# Patient Record
Sex: FEMALE | Race: WHITE | Employment: OTHER | URBAN - METROPOLITAN AREA
[De-identification: names, ages, dates, MRNs, and addresses within clinical notes are randomized per-mention and may not be internally consistent; named-entity substitution may affect disease eponyms.]

---

## 2018-10-29 ENCOUNTER — TELEPHONE (OUTPATIENT)
Dept: FAMILY MEDICINE CLINIC | Age: 68
End: 2018-10-29

## 2018-10-29 NOTE — TELEPHONE ENCOUNTER
Tell the nurse we have never seen this patient, no record in Epic to go by  And dont know  Who order the home care.

## 2018-10-30 ENCOUNTER — TELEPHONE (OUTPATIENT)
Dept: FAMILY MEDICINE CLINIC | Age: 68
End: 2018-10-30

## 2018-10-31 ENCOUNTER — OFFICE VISIT (OUTPATIENT)
Dept: FAMILY MEDICINE CLINIC | Age: 68
End: 2018-10-31
Payer: MEDICARE

## 2018-10-31 VITALS
SYSTOLIC BLOOD PRESSURE: 138 MMHG | HEART RATE: 121 BPM | WEIGHT: 106 LBS | BODY MASS INDEX: 18.78 KG/M2 | HEIGHT: 63 IN | OXYGEN SATURATION: 97 % | DIASTOLIC BLOOD PRESSURE: 70 MMHG

## 2018-10-31 DIAGNOSIS — K21.9 GASTROESOPHAGEAL REFLUX DISEASE WITHOUT ESOPHAGITIS: ICD-10-CM

## 2018-10-31 DIAGNOSIS — K94.22: ICD-10-CM

## 2018-10-31 DIAGNOSIS — I10 ESSENTIAL HYPERTENSION: ICD-10-CM

## 2018-10-31 DIAGNOSIS — E78.00 PURE HYPERCHOLESTEROLEMIA: ICD-10-CM

## 2018-10-31 DIAGNOSIS — Z93.1 GASTROSTOMY TUBE IN PLACE (HCC): Primary | ICD-10-CM

## 2018-10-31 PROCEDURE — 99203 OFFICE O/P NEW LOW 30 MIN: CPT | Performed by: INTERNAL MEDICINE

## 2018-10-31 RX ORDER — HYDROXYZINE HYDROCHLORIDE 25 MG/1
25 TABLET, FILM COATED ORAL 3 TIMES DAILY PRN
COMMUNITY
End: 2020-06-29 | Stop reason: ALTCHOICE

## 2018-10-31 RX ORDER — PANTOPRAZOLE SODIUM 40 MG/1
40 GRANULE, DELAYED RELEASE ORAL
COMMUNITY
End: 2020-10-06

## 2018-10-31 RX ORDER — ALBUTEROL SULFATE 90 UG/1
2 AEROSOL, METERED RESPIRATORY (INHALATION) EVERY 6 HOURS PRN
COMMUNITY
End: 2021-12-07 | Stop reason: SDUPTHER

## 2018-10-31 RX ORDER — MONTELUKAST SODIUM 10 MG/1
10 TABLET ORAL NIGHTLY
COMMUNITY
End: 2020-06-29 | Stop reason: ALTCHOICE

## 2018-10-31 RX ORDER — CHOLECALCIFEROL (VITAMIN D3) 1250 MCG
CAPSULE ORAL
COMMUNITY
End: 2020-10-23 | Stop reason: CLARIF

## 2018-10-31 RX ORDER — ATORVASTATIN CALCIUM 20 MG/1
20 TABLET, FILM COATED ORAL DAILY
COMMUNITY
End: 2020-06-29 | Stop reason: ALTCHOICE

## 2018-10-31 RX ORDER — LANOLIN ALCOHOL/MO/W.PET/CERES
3 CREAM (GRAM) TOPICAL DAILY
COMMUNITY
End: 2020-03-11 | Stop reason: CLARIF

## 2018-10-31 RX ORDER — LISINOPRIL 10 MG/1
10 TABLET ORAL DAILY
COMMUNITY
End: 2018-11-14

## 2018-10-31 RX ORDER — CYANOCOBALAMIN 1000 UG/ML
1000 INJECTION INTRAMUSCULAR; SUBCUTANEOUS ONCE
COMMUNITY
End: 2020-03-11 | Stop reason: CLARIF

## 2018-10-31 RX ORDER — MIRTAZAPINE 15 MG/1
15 TABLET, FILM COATED ORAL NIGHTLY
COMMUNITY
End: 2018-11-14 | Stop reason: SDUPTHER

## 2018-10-31 RX ORDER — DILTIAZEM HYDROCHLORIDE 60 MG/1
60 TABLET, FILM COATED ORAL 4 TIMES DAILY
COMMUNITY
End: 2018-11-19

## 2018-10-31 ASSESSMENT — PATIENT HEALTH QUESTIONNAIRE - PHQ9
SUM OF ALL RESPONSES TO PHQ QUESTIONS 1-9: 2
SUM OF ALL RESPONSES TO PHQ9 QUESTIONS 1 & 2: 2
SUM OF ALL RESPONSES TO PHQ QUESTIONS 1-9: 2
2. FEELING DOWN, DEPRESSED OR HOPELESS: 1
1. LITTLE INTEREST OR PLEASURE IN DOING THINGS: 1

## 2018-11-01 PROBLEM — E78.00 PURE HYPERCHOLESTEROLEMIA: Status: ACTIVE | Noted: 2018-11-01

## 2018-11-01 PROBLEM — I10 ESSENTIAL HYPERTENSION: Status: ACTIVE | Noted: 2018-11-01

## 2018-11-01 PROBLEM — K21.9 GASTROESOPHAGEAL REFLUX DISEASE WITHOUT ESOPHAGITIS: Status: ACTIVE | Noted: 2018-11-01

## 2018-11-01 ASSESSMENT — ENCOUNTER SYMPTOMS
COLOR CHANGE: 1
ABDOMINAL PAIN: 1
NAUSEA: 1
COUGH: 0
VOMITING: 0

## 2018-11-14 ENCOUNTER — OFFICE VISIT (OUTPATIENT)
Dept: FAMILY MEDICINE CLINIC | Age: 68
End: 2018-11-14
Payer: MEDICARE

## 2018-11-14 VITALS
SYSTOLIC BLOOD PRESSURE: 140 MMHG | HEART RATE: 110 BPM | HEIGHT: 63 IN | BODY MASS INDEX: 19.31 KG/M2 | OXYGEN SATURATION: 96 % | WEIGHT: 109 LBS | DIASTOLIC BLOOD PRESSURE: 80 MMHG

## 2018-11-14 DIAGNOSIS — I10 ESSENTIAL HYPERTENSION: Primary | ICD-10-CM

## 2018-11-14 DIAGNOSIS — E78.00 PURE HYPERCHOLESTEROLEMIA: ICD-10-CM

## 2018-11-14 DIAGNOSIS — K21.9 GASTROESOPHAGEAL REFLUX DISEASE WITHOUT ESOPHAGITIS: ICD-10-CM

## 2018-11-14 DIAGNOSIS — F51.02 ADJUSTMENT INSOMNIA: ICD-10-CM

## 2018-11-14 DIAGNOSIS — J30.9 ALLERGIC RHINITIS, UNSPECIFIED SEASONALITY, UNSPECIFIED TRIGGER: ICD-10-CM

## 2018-11-14 DIAGNOSIS — Z91.81 AT HIGH RISK FOR FALLS: ICD-10-CM

## 2018-11-14 PROCEDURE — 4004F PT TOBACCO SCREEN RCVD TLK: CPT | Performed by: INTERNAL MEDICINE

## 2018-11-14 PROCEDURE — G8484 FLU IMMUNIZE NO ADMIN: HCPCS | Performed by: INTERNAL MEDICINE

## 2018-11-14 PROCEDURE — G8400 PT W/DXA NO RESULTS DOC: HCPCS | Performed by: INTERNAL MEDICINE

## 2018-11-14 PROCEDURE — G8427 DOCREV CUR MEDS BY ELIG CLIN: HCPCS | Performed by: INTERNAL MEDICINE

## 2018-11-14 PROCEDURE — G8420 CALC BMI NORM PARAMETERS: HCPCS | Performed by: INTERNAL MEDICINE

## 2018-11-14 PROCEDURE — 3017F COLORECTAL CA SCREEN DOC REV: CPT | Performed by: INTERNAL MEDICINE

## 2018-11-14 PROCEDURE — 1123F ACP DISCUSS/DSCN MKR DOCD: CPT | Performed by: INTERNAL MEDICINE

## 2018-11-14 PROCEDURE — 1101F PT FALLS ASSESS-DOCD LE1/YR: CPT | Performed by: INTERNAL MEDICINE

## 2018-11-14 PROCEDURE — 99214 OFFICE O/P EST MOD 30 MIN: CPT | Performed by: INTERNAL MEDICINE

## 2018-11-14 PROCEDURE — 4040F PNEUMOC VAC/ADMIN/RCVD: CPT | Performed by: INTERNAL MEDICINE

## 2018-11-14 PROCEDURE — 1090F PRES/ABSN URINE INCON ASSESS: CPT | Performed by: INTERNAL MEDICINE

## 2018-11-14 RX ORDER — DILTIAZEM HYDROCHLORIDE 180 MG/1
180 CAPSULE, COATED, EXTENDED RELEASE ORAL DAILY
Qty: 30 CAPSULE | Refills: 3 | Status: SHIPPED | OUTPATIENT
Start: 2018-11-14 | End: 2020-06-29 | Stop reason: ALTCHOICE

## 2018-11-14 RX ORDER — MIRTAZAPINE 15 MG/1
15 TABLET, FILM COATED ORAL NIGHTLY
Qty: 30 TABLET | Refills: 3 | Status: SHIPPED | OUTPATIENT
Start: 2018-11-14 | End: 2020-03-11 | Stop reason: CLARIF

## 2018-11-14 RX ORDER — MONTELUKAST SODIUM 10 MG/1
10 TABLET ORAL DAILY
Qty: 30 TABLET | Refills: 3 | Status: SHIPPED | OUTPATIENT
Start: 2018-11-14 | End: 2020-03-11 | Stop reason: CLARIF

## 2018-11-14 RX ORDER — PANTOPRAZOLE SODIUM 40 MG/1
40 TABLET, DELAYED RELEASE ORAL
Qty: 30 TABLET | Refills: 3 | Status: SHIPPED | OUTPATIENT
Start: 2018-11-14 | End: 2020-03-11 | Stop reason: CLARIF

## 2018-11-14 NOTE — PATIENT INSTRUCTIONS
Patient Education        High Blood Pressure: Care Instructions  Your Care Instructions    If your blood pressure is usually above 130/80, you have high blood pressure, or hypertension. That means the top number is 130 or higher or the bottom number is 80 or higher, or both. Despite what a lot of people think, high blood pressure usually doesn't cause headaches or make you feel dizzy or lightheaded. It usually has no symptoms. But it does increase your risk for heart attack, stroke, and kidney or eye damage. The higher your blood pressure, the more your risk increases. Your doctor will give you a goal for your blood pressure. Your goal will be based on your health and your age. Lifestyle changes, such as eating healthy and being active, are always important to help lower blood pressure. You might also take medicine to reach your blood pressure goal.  Follow-up care is a key part of your treatment and safety. Be sure to make and go to all appointments, and call your doctor if you are having problems. It's also a good idea to know your test results and keep a list of the medicines you take. How can you care for yourself at home? Medical treatment  · If you stop taking your medicine, your blood pressure will go back up. You may take one or more types of medicine to lower your blood pressure. Be safe with medicines. Take your medicine exactly as prescribed. Call your doctor if you think you are having a problem with your medicine. · Talk to your doctor before you start taking aspirin every day. Aspirin can help certain people lower their risk of a heart attack or stroke. But taking aspirin isn't right for everyone, because it can cause serious bleeding. · See your doctor regularly. You may need to see the doctor more often at first or until your blood pressure comes down.   · If you are taking blood pressure medicine, talk to your doctor before you take decongestants or anti-inflammatory medicine, such as ibuprofen. Some of these medicines can raise blood pressure. · Learn how to check your blood pressure at home. Lifestyle changes  · Stay at a healthy weight. This is especially important if you put on weight around the waist. Losing even 10 pounds can help you lower your blood pressure. · If your doctor recommends it, get more exercise. Walking is a good choice. Bit by bit, increase the amount you walk every day. Try for at least 30 minutes on most days of the week. You also may want to swim, bike, or do other activities. · Avoid or limit alcohol. Talk to your doctor about whether you can drink any alcohol. · Try to limit how much sodium you eat to less than 2,300 milligrams (mg) a day. Your doctor may ask you to try to eat less than 1,500 mg a day. · Eat plenty of fruits (such as bananas and oranges), vegetables, legumes, whole grains, and low-fat dairy products. · Lower the amount of saturated fat in your diet. Saturated fat is found in animal products such as milk, cheese, and meat. Limiting these foods may help you lose weight and also lower your risk for heart disease. · Do not smoke. Smoking increases your risk for heart attack and stroke. If you need help quitting, talk to your doctor about stop-smoking programs and medicines. These can increase your chances of quitting for good. When should you call for help? Call 911 anytime you think you may need emergency care. This may mean having symptoms that suggest that your blood pressure is causing a serious heart or blood vessel problem. Your blood pressure may be over 180/120.   For example, call 911 if:    · You have symptoms of a heart attack. These may include:  ? Chest pain or pressure, or a strange feeling in the chest.  ? Sweating. ? Shortness of breath. ? Nausea or vomiting. ? Pain, pressure, or a strange feeling in the back, neck, jaw, or upper belly or in one or both shoulders or arms. ? Lightheadedness or sudden weakness.   ? A fast or irregular heartbeat.     · You have symptoms of a stroke. These may include:  ? Sudden numbness, tingling, weakness, or loss of movement in your face, arm, or leg, especially on only one side of your body. ? Sudden vision changes. ? Sudden trouble speaking. ? Sudden confusion or trouble understanding simple statements. ? Sudden problems with walking or balance. ? A sudden, severe headache that is different from past headaches.     · You have severe back or belly pain.    Do not wait until your blood pressure comes down on its own. Get help right away.   Call your doctor now or seek immediate care if:    · Your blood pressure is much higher than normal (such as 180/120 or higher), but you don't have symptoms.     · You think high blood pressure is causing symptoms, such as:  ? Severe headache.  ? Blurry vision.    Watch closely for changes in your health, and be sure to contact your doctor if:    · Your blood pressure measures higher than your doctor recommends at least 2 times. That means the top number is higher or the bottom number is higher, or both.     · You think you may be having side effects from your blood pressure medicine. Where can you learn more? Go to [email or web address] and sign in to your [employer] account. Enter G397 in the [employer] box to learn more about \"High Blood Pressure: Care Instructions. \"     If you do not have an account, please click on the \"Sign Up Now\" link. Current as of: December 6, 2017  Content Version: 11.8  © 5234-9971 [employer], [employer]. Care instructions adapted under license by [facility] ([facility]). If you have questions about a medical condition or this instruction, always ask your healthcare professional. [place] [address] any warranty or liability for your use of this information.

## 2018-11-14 NOTE — PROGRESS NOTES
(VITAMIN D3) 10960 units CAPS Take by mouth      mirtazapine (REMERON) 15 MG tablet Take 15 mg by mouth nightly      melatonin 3 MG TABS tablet Take 3 mg by mouth daily      hydrOXYzine (ATARAX) 25 MG tablet Take 25 mg by mouth 3 times daily as needed for Itching      diltiazem (CARDIZEM) 60 MG tablet Take 60 mg by mouth 4 times daily      albuterol sulfate  (90 Base) MCG/ACT inhaler Inhale 2 puffs into the lungs every 6 hours as needed for Wheezing      atorvastatin (LIPITOR) 20 MG tablet Take 20 mg by mouth daily      montelukast (SINGULAIR) 10 MG tablet Take 10 mg by mouth nightly      pantoprazole sodium (PROTONIX) 40 MG PACK packet Take 40 mg by mouth every morning (before breakfast)      aspirin 81 MG tablet Take 81 mg by mouth daily      DiphenhydrAMINE HCl (ALLERGY MED PO) Take by mouth       No current facility-administered medications for this visit. Social History     Social History    Marital status: Unknown     Spouse name: N/A    Number of children: N/A    Years of education: N/A     Occupational History    Not on file. Social History Main Topics    Smoking status: Current Some Day Smoker     Packs/day: 0.25     Years: 50.00     Types: Cigarettes    Smokeless tobacco: Never Used    Alcohol use No    Drug use: No    Sexual activity: No     Other Topics Concern    Not on file     Social History Narrative    No narrative on file       Vitals:    11/14/18 1558   BP: (!) 140/80   Pulse:    SpO2:         Body mass index is 19.31 kg/m². Physical Exam   Constitutional: She is oriented to person, place, and time. Cardiovascular: Regular rhythm. Pulmonary/Chest: Breath sounds normal.   Abdominal: Soft.   g-tube opening healing   Neurological: She is alert and oriented to person, place, and time. No cranial nerve deficit. Skin: Skin is warm and dry. Psychiatric: She has a normal mood and affect. anxious       ASSESSMENT/PLAN:  1.  Essential hypertension    -

## 2018-11-19 ASSESSMENT — ENCOUNTER SYMPTOMS
COUGH: 0
COLOR CHANGE: 0
VOMITING: 0
NAUSEA: 0

## 2018-12-13 ENCOUNTER — TELEPHONE (OUTPATIENT)
Dept: FAMILY MEDICINE CLINIC | Age: 68
End: 2018-12-13

## 2019-07-01 ENCOUNTER — TELEPHONE (OUTPATIENT)
Dept: FAMILY MEDICINE CLINIC | Age: 69
End: 2019-07-01

## 2019-07-01 NOTE — TELEPHONE ENCOUNTER
Patient is having a lot of pain in back now worse from last teddy. She needs to get in with Dr. Marie Anaya and try to come in asap. cb pt to discuss this cannot get into [facility] for awhile.  Call her cell [phone]

## 2019-12-13 ENCOUNTER — TELEPHONE (OUTPATIENT)
Dept: PULMONOLOGY | Age: 69
End: 2019-12-13

## 2019-12-13 NOTE — TELEPHONE ENCOUNTER
Received NPT referral to be seen for COPD. LM asking patient to return call to office.   Referral scanned in Media

## 2020-02-04 ENCOUNTER — TELEPHONE (OUTPATIENT)
Dept: PULMONOLOGY | Age: 70
End: 2020-02-04

## 2020-02-04 NOTE — TELEPHONE ENCOUNTER
Patient did not show for NPT appointment  with Michelle Granados on 2/4/20    Same Day Cancellation: No    Patient rescheduled:  No    New appointment:  n/a    Patient was also no show on: n/a

## 2020-03-11 ENCOUNTER — OFFICE VISIT (OUTPATIENT)
Dept: PULMONOLOGY | Age: 70
End: 2020-03-11
Payer: MEDICARE

## 2020-03-11 VITALS
TEMPERATURE: 98.2 F | WEIGHT: 102 LBS | HEIGHT: 63 IN | BODY MASS INDEX: 18.07 KG/M2 | RESPIRATION RATE: 16 BRPM | OXYGEN SATURATION: 94 % | DIASTOLIC BLOOD PRESSURE: 85 MMHG | HEART RATE: 112 BPM | SYSTOLIC BLOOD PRESSURE: 130 MMHG

## 2020-03-11 PROCEDURE — 3017F COLORECTAL CA SCREEN DOC REV: CPT | Performed by: INTERNAL MEDICINE

## 2020-03-11 PROCEDURE — 1123F ACP DISCUSS/DSCN MKR DOCD: CPT | Performed by: INTERNAL MEDICINE

## 2020-03-11 PROCEDURE — 4004F PT TOBACCO SCREEN RCVD TLK: CPT | Performed by: INTERNAL MEDICINE

## 2020-03-11 PROCEDURE — G8484 FLU IMMUNIZE NO ADMIN: HCPCS | Performed by: INTERNAL MEDICINE

## 2020-03-11 PROCEDURE — G8427 DOCREV CUR MEDS BY ELIG CLIN: HCPCS | Performed by: INTERNAL MEDICINE

## 2020-03-11 PROCEDURE — G0296 VISIT TO DETERM LDCT ELIG: HCPCS | Performed by: INTERNAL MEDICINE

## 2020-03-11 PROCEDURE — 4040F PNEUMOC VAC/ADMIN/RCVD: CPT | Performed by: INTERNAL MEDICINE

## 2020-03-11 PROCEDURE — 99204 OFFICE O/P NEW MOD 45 MIN: CPT | Performed by: INTERNAL MEDICINE

## 2020-03-11 PROCEDURE — G8419 CALC BMI OUT NRM PARAM NOF/U: HCPCS | Performed by: INTERNAL MEDICINE

## 2020-03-11 PROCEDURE — G8400 PT W/DXA NO RESULTS DOC: HCPCS | Performed by: INTERNAL MEDICINE

## 2020-03-11 PROCEDURE — 1090F PRES/ABSN URINE INCON ASSESS: CPT | Performed by: INTERNAL MEDICINE

## 2020-03-11 PROCEDURE — G8926 SPIRO NO PERF OR DOC: HCPCS | Performed by: INTERNAL MEDICINE

## 2020-03-11 PROCEDURE — 3023F SPIROM DOC REV: CPT | Performed by: INTERNAL MEDICINE

## 2020-03-11 RX ORDER — FLUTICASONE FUROATE, UMECLIDINIUM BROMIDE AND VILANTEROL TRIFENATATE 100; 62.5; 25 UG/1; UG/1; UG/1
1 POWDER RESPIRATORY (INHALATION) DAILY
Qty: 2 EACH | Refills: 0
Start: 2020-03-11 | End: 2022-05-06 | Stop reason: SDUPTHER

## 2020-03-11 RX ORDER — ALPRAZOLAM 1 MG/1
1 TABLET ORAL NIGHTLY PRN
COMMUNITY
End: 2020-10-23 | Stop reason: CLARIF

## 2020-03-11 RX ORDER — FLUTICASONE PROPIONATE 50 MCG
1 SPRAY, SUSPENSION (ML) NASAL DAILY
Qty: 2 BOTTLE | Refills: 1 | Status: SHIPPED | OUTPATIENT
Start: 2020-03-11 | End: 2021-10-12
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
## 2020-04-14 ENCOUNTER — TELEPHONE (OUTPATIENT)
Dept: PULMONOLOGY | Age: 70
End: 2020-04-14

## 2020-04-14 NOTE — TELEPHONE ENCOUNTER
the terms described in the Terms of Service and this Telehealth Consent. The patient was read the following statement and has consented to the visit as of 4/14/20. The patient has been scheduled for their first telehealth visit on 4/16/20 with Dr. Amarjit Velasquez.

## 2020-04-16 ENCOUNTER — VIRTUAL VISIT (OUTPATIENT)
Dept: PULMONOLOGY | Age: 70
End: 2020-04-16
Payer: MEDICARE

## 2020-04-16 PROBLEM — F17.200 CURRENT SMOKER: Status: ACTIVE | Noted: 2020-04-16

## 2020-04-16 PROBLEM — J44.1 CHRONIC OBSTRUCTIVE PULMONARY DISEASE WITH ACUTE EXACERBATION (HCC): Status: ACTIVE | Noted: 2020-04-16

## 2020-04-16 PROBLEM — J30.9 CHRONIC ALLERGIC RHINITIS: Status: ACTIVE | Noted: 2020-04-16

## 2020-04-16 PROCEDURE — G8419 CALC BMI OUT NRM PARAM NOF/U: HCPCS | Performed by: INTERNAL MEDICINE

## 2020-04-16 PROCEDURE — 3017F COLORECTAL CA SCREEN DOC REV: CPT | Performed by: INTERNAL MEDICINE

## 2020-04-16 PROCEDURE — G8926 SPIRO NO PERF OR DOC: HCPCS | Performed by: INTERNAL MEDICINE

## 2020-04-16 PROCEDURE — 1123F ACP DISCUSS/DSCN MKR DOCD: CPT | Performed by: INTERNAL MEDICINE

## 2020-04-16 PROCEDURE — G8427 DOCREV CUR MEDS BY ELIG CLIN: HCPCS | Performed by: INTERNAL MEDICINE

## 2020-04-16 PROCEDURE — 3023F SPIROM DOC REV: CPT | Performed by: INTERNAL MEDICINE

## 2020-04-16 PROCEDURE — G8400 PT W/DXA NO RESULTS DOC: HCPCS | Performed by: INTERNAL MEDICINE

## 2020-04-16 PROCEDURE — 99214 OFFICE O/P EST MOD 30 MIN: CPT | Performed by: INTERNAL MEDICINE

## 2020-04-16 PROCEDURE — 1090F PRES/ABSN URINE INCON ASSESS: CPT | Performed by: INTERNAL MEDICINE

## 2020-04-16 PROCEDURE — 4040F PNEUMOC VAC/ADMIN/RCVD: CPT | Performed by: INTERNAL MEDICINE

## 2020-04-16 PROCEDURE — 4004F PT TOBACCO SCREEN RCVD TLK: CPT | Performed by: INTERNAL MEDICINE

## 2020-04-16 RX ORDER — PREDNISONE 20 MG/1
20 TABLET ORAL DAILY
Qty: 10 TABLET | Refills: 0 | Status: SHIPPED | OUTPATIENT
Start: 2020-04-16 | End: 2020-04-26

## 2020-04-16 RX ORDER — AZITHROMYCIN 250 MG/1
250 TABLET, FILM COATED ORAL SEE ADMIN INSTRUCTIONS
Qty: 6 TABLET | Refills: 0 | Status: SHIPPED | OUTPATIENT
Start: 2020-04-16 | End: 2020-04-21
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
## 2020-05-14 ENCOUNTER — VIRTUAL VISIT (OUTPATIENT)
Dept: PULMONOLOGY | Age: 70
End: 2020-05-14
Payer: MEDICARE

## 2020-05-14 ENCOUNTER — TELEPHONE (OUTPATIENT)
Dept: PULMONOLOGY | Age: 70
End: 2020-05-14

## 2020-05-14 PROCEDURE — G8427 DOCREV CUR MEDS BY ELIG CLIN: HCPCS | Performed by: INTERNAL MEDICINE

## 2020-05-14 PROCEDURE — 4040F PNEUMOC VAC/ADMIN/RCVD: CPT | Performed by: INTERNAL MEDICINE

## 2020-05-14 PROCEDURE — 3023F SPIROM DOC REV: CPT | Performed by: INTERNAL MEDICINE

## 2020-05-14 PROCEDURE — 4004F PT TOBACCO SCREEN RCVD TLK: CPT | Performed by: INTERNAL MEDICINE

## 2020-05-14 PROCEDURE — 1090F PRES/ABSN URINE INCON ASSESS: CPT | Performed by: INTERNAL MEDICINE

## 2020-05-14 PROCEDURE — 1123F ACP DISCUSS/DSCN MKR DOCD: CPT | Performed by: INTERNAL MEDICINE

## 2020-05-14 PROCEDURE — 99214 OFFICE O/P EST MOD 30 MIN: CPT | Performed by: INTERNAL MEDICINE

## 2020-05-14 PROCEDURE — G8926 SPIRO NO PERF OR DOC: HCPCS | Performed by: INTERNAL MEDICINE

## 2020-05-14 PROCEDURE — 3017F COLORECTAL CA SCREEN DOC REV: CPT | Performed by: INTERNAL MEDICINE

## 2020-05-14 PROCEDURE — G8400 PT W/DXA NO RESULTS DOC: HCPCS | Performed by: INTERNAL MEDICINE

## 2020-05-14 PROCEDURE — G8419 CALC BMI OUT NRM PARAM NOF/U: HCPCS | Performed by: INTERNAL MEDICINE

## 2020-05-14 RX ORDER — METHYLPREDNISOLONE 4 MG/1
TABLET ORAL
Qty: 1 KIT | Refills: 0 | Status: SHIPPED | OUTPATIENT
Start: 2020-05-14 | End: 2020-05-20

## 2020-05-14 RX ORDER — LEVOFLOXACIN 500 MG/1
500 TABLET, FILM COATED ORAL DAILY
Qty: 7 TABLET | Refills: 0 | Status: SHIPPED | OUTPATIENT
Start: 2020-05-14 | End: 2020-05-21

## 2020-05-14 NOTE — PROGRESS NOTES
and patient was told that on that basis we can tell if he needs any oxygen or not  · Patient also has increased nasal congestion and chronic allergic rhinitis  · Patient was told to try some saline nasal spray over-the-counter  · Stimulation at nighttime along with humidifier in the bedroom may help  · Given that patient has multiple exposures to animal danders a HEPA filter may be helpful if she can afford  · Patient to continue with [facility] and patient is going to get her medication today from the pharmacy has been told by the patient  · Patient appears to have some acute exacerbation of COPD at this time  · Levaquin and medrol dosepak ordered  · Patient needs to get a primary care provider to be the noted person for her care  · Smoking cessation reinforced  · Patient will benefit from a flu shot and pneumonia vaccine-will think about it and decide on next visit  · Smoking cessation reinforced  · Patient to take albuterol inhaler on whenever necessary basis  · Patient was told about diet and lifestyle modifications patient in view of that patient has reflux symptoms  · Patient should avoid taking narcotics and sedatives and the effect causing respiratory depression were discussed  · Patient was told to maintain social distancing and to wear facemask  · Patient to practice self quarantine  · Patient to follow-up in 1 month time or earlier if required    Alyx Mohan is a 81 y.o. female being evaluated by a Virtual Visit (video visit) encounter to address concerns as mentioned above. A caregiver was present when appropriate. Due to this being a TeleHealth encounter (During RGOGT-91 public health emergency), evaluation of the following organ systems was limited: Vitals/Constitutional/EENT/Resp/CV/GI//MS/Neuro/Skin/Heme-Lymph-Imm.   Pursuant to the emergency declaration under the [facility] [address] waiver authority and the [employer] and [employer]

## 2020-06-29 ENCOUNTER — VIRTUAL VISIT (OUTPATIENT)
Dept: PULMONOLOGY | Age: 70
End: 2020-06-29
Payer: MEDICARE

## 2020-06-29 PROBLEM — J41.0 SIMPLE CHRONIC BRONCHITIS (HCC): Status: ACTIVE | Noted: 2020-06-29

## 2020-06-29 PROCEDURE — G8427 DOCREV CUR MEDS BY ELIG CLIN: HCPCS | Performed by: INTERNAL MEDICINE

## 2020-06-29 PROCEDURE — G8926 SPIRO NO PERF OR DOC: HCPCS | Performed by: INTERNAL MEDICINE

## 2020-06-29 PROCEDURE — 3017F COLORECTAL CA SCREEN DOC REV: CPT | Performed by: INTERNAL MEDICINE

## 2020-06-29 PROCEDURE — G8400 PT W/DXA NO RESULTS DOC: HCPCS | Performed by: INTERNAL MEDICINE

## 2020-06-29 PROCEDURE — 4040F PNEUMOC VAC/ADMIN/RCVD: CPT | Performed by: INTERNAL MEDICINE

## 2020-06-29 PROCEDURE — G8419 CALC BMI OUT NRM PARAM NOF/U: HCPCS | Performed by: INTERNAL MEDICINE

## 2020-06-29 PROCEDURE — 1090F PRES/ABSN URINE INCON ASSESS: CPT | Performed by: INTERNAL MEDICINE

## 2020-06-29 PROCEDURE — 1123F ACP DISCUSS/DSCN MKR DOCD: CPT | Performed by: INTERNAL MEDICINE

## 2020-06-29 PROCEDURE — 99214 OFFICE O/P EST MOD 30 MIN: CPT | Performed by: INTERNAL MEDICINE

## 2020-06-29 PROCEDURE — 4004F PT TOBACCO SCREEN RCVD TLK: CPT | Performed by: INTERNAL MEDICINE

## 2020-06-29 PROCEDURE — 3023F SPIROM DOC REV: CPT | Performed by: INTERNAL MEDICINE

## 2020-06-29 RX ORDER — OMEPRAZOLE 20 MG/1
20 CAPSULE, DELAYED RELEASE ORAL
Qty: 60 CAPSULE | Refills: 0 | Status: SHIPPED | OUTPATIENT
Start: 2020-06-29 | End: 2020-07-30

## 2020-06-29 NOTE — PROGRESS NOTES
Chief Complaint/Referring Provider:  Pulmonary follow up    A virtual pulmonary follow-up was done for the patient for her pulmonary issues, patient still has some cough with shortness of breath, patient states that she has some sinus congestion with rhinorrhea, patient does not have any increasing cough or expectoration which is more than usual, patient does not have any purulence in the secretions, no hemoptysis, patient does not have any epistaxis, patient states that she still has to use her rescue inhaler about 3 times a day, patient does not have any fever or chest pains, patient has lots of heartburns and patient states that she has not been taking any medication for the heartburns because she was told by her family and 1 of the medications is causing cancer, patient continues to be a smoker, patient states that her feet hurt a lot, patient does not have any change in the ambient environment, patient does not have any confusion lethargy, no other pertinent review of system of concern    Previous  [facility] pulmonary follow-up done, patient was seen few weeks back because of COPD exacerbation and was given a Z-Favio with some prednisone and patient states that after she took that she was feeling better better for few days then patient again is having increased shortness of breath and also having decreased activities of daily living, patient has been having respite secretions which are clear to yellow in color along with that patient also states that she has been having some increased wheezing, patient has been using Flonase and Singulair, patient does not have any fever or chills, patient does have some otalgia, patient also has some little sore throat, patient does not have any difficulty in swallowing, patient has been having increased chest tightness, patient does not have any significant abdominal symptoms but patient states that she feels tired and having no energy, patient states that her PCP had called file   Relationships    Social connections     Talks on phone: Not on file     Gets together: Not on file     Attends Congregational service: Not on file     Active member of club or organization: Not on file     Attends meetings of clubs or organizations: Not on file     Relationship status: Not on file    Intimate partner violence     Fear of current or ex partner: Not on file     Emotionally abused: Not on file     Physically abused: Not on file     Forced sexual activity: Not on file   Other Topics Concern    Not on file   Social History Narrative    Not on file       History reviewed. No pertinent family history. Review of Systems same as above    Physical Exam:  not currently breastfeeding.'  Constitutional:  No acute distress. HENT:  Oropharynx is clear and moist. No thyromegaly. Nasal mucosal hyperemia and congestion along with posterior pharyngeal wall cobbling  Eyes:  Conjunctivae arenormal. Pupils equal, round, and reactive to light. No scleral icterus. Neck: . No tracheal deviation present. No obvious thyroid mass. Cardiovascular:Normal rate, regular rhythm, normal heart sounds. No right ventricular heave. Nolower extremity edema. Pulmonary/Chest: No wheezes. No rales. Chest wall is not dull to percussion. Noaccessory muscle usage or stridor. Decreased breath sound intensity  Abdominal: Soft. Bowel sounds present. No distension or hernia. Notenderness. Abdominal binder present  Musculoskeletal: No cyanosis. No clubbing. No obvious joint deformity. Lymphadenopathy: No cervical or supraclavicular adenopathy. Skin: Skin is warm and dry. No rash or nodules on the exposed extremities. Psychiatric: Normal mood and affect. Behavior is normal.  No anxiety. Neurologic: Alert, awake and oriented. PERRL. Speech fluent    (NOTE DONE)    Data:     Imaging:  I have reviewed radiology images personally. No orders to display           Assessment:    1.  Simple chronic bronchitis ([facility] [address] with this being a TeleHealth encounter (During LATZL-09 public health emergency), evaluation of the following organ systems was limited: Vitals/Constitutional/EENT/Resp/CV/GI//MS/Neuro/Skin/Heme-Lymph-Imm. Pursuant to the emergency declaration under the [facility], [address] [place] [address] and the [employer] and [employer] Act, this Virtual Visit was conducted with patient's (and/or legal guardian's) consent, to reduce the patient's risk of exposure to COVID-19 and provide necessary medical care. The patient (and/or legal guardian) has also been advised to contact this office for worsening conditions or problems, and seek emergency medical treatment and/or call 911 if deemed necessary. Patient identification was verified at the start of the visit: Yes    Total time spent for this encounter: Not billed by time    Services were provided through a video synchronous discussion virtually to substitute for in-person clinic visit. Patient and provider were located at their individual homes. --Percy Flaherty MD on 6/29/2020 at 2:54 PM    An electronic signature was used to authenticate this note.    --Percy Flaherty MD on 6/29/2020 at 2:54 PM    An electronic signature was used to authenticate this note.

## 2020-07-30 RX ORDER — OMEPRAZOLE 20 MG/1
CAPSULE, DELAYED RELEASE ORAL
Qty: 180 CAPSULE | Refills: 5 | Status: SHIPPED | OUTPATIENT
Start: 2020-07-30 | End: 2021-10-12

## 2020-09-04 RX ORDER — LISINOPRIL 10 MG/1
TABLET ORAL
Qty: 180 TABLET | OUTPATIENT
Start: 2020-09-04

## 2020-10-06 ENCOUNTER — TELEMEDICINE (OUTPATIENT)
Dept: FAMILY MEDICINE CLINIC | Age: 70
End: 2020-10-06
Payer: MEDICARE

## 2020-10-06 ENCOUNTER — VIRTUAL VISIT (OUTPATIENT)
Dept: PULMONOLOGY | Age: 70
End: 2020-10-06
Payer: MEDICARE

## 2020-10-06 PROCEDURE — 1090F PRES/ABSN URINE INCON ASSESS: CPT | Performed by: NURSE PRACTITIONER

## 2020-10-06 PROCEDURE — G8427 DOCREV CUR MEDS BY ELIG CLIN: HCPCS | Performed by: INTERNAL MEDICINE

## 2020-10-06 PROCEDURE — 99214 OFFICE O/P EST MOD 30 MIN: CPT | Performed by: INTERNAL MEDICINE

## 2020-10-06 PROCEDURE — 1123F ACP DISCUSS/DSCN MKR DOCD: CPT | Performed by: NURSE PRACTITIONER

## 2020-10-06 PROCEDURE — 99203 OFFICE O/P NEW LOW 30 MIN: CPT | Performed by: NURSE PRACTITIONER

## 2020-10-06 PROCEDURE — 3023F SPIROM DOC REV: CPT | Performed by: INTERNAL MEDICINE

## 2020-10-06 PROCEDURE — 4040F PNEUMOC VAC/ADMIN/RCVD: CPT | Performed by: NURSE PRACTITIONER

## 2020-10-06 PROCEDURE — G8400 PT W/DXA NO RESULTS DOC: HCPCS | Performed by: NURSE PRACTITIONER

## 2020-10-06 PROCEDURE — 3017F COLORECTAL CA SCREEN DOC REV: CPT | Performed by: NURSE PRACTITIONER

## 2020-10-06 PROCEDURE — 4040F PNEUMOC VAC/ADMIN/RCVD: CPT | Performed by: INTERNAL MEDICINE

## 2020-10-06 PROCEDURE — 1123F ACP DISCUSS/DSCN MKR DOCD: CPT | Performed by: INTERNAL MEDICINE

## 2020-10-06 PROCEDURE — G8484 FLU IMMUNIZE NO ADMIN: HCPCS | Performed by: INTERNAL MEDICINE

## 2020-10-06 PROCEDURE — 3017F COLORECTAL CA SCREEN DOC REV: CPT | Performed by: INTERNAL MEDICINE

## 2020-10-06 PROCEDURE — 4004F PT TOBACCO SCREEN RCVD TLK: CPT | Performed by: INTERNAL MEDICINE

## 2020-10-06 PROCEDURE — 1090F PRES/ABSN URINE INCON ASSESS: CPT | Performed by: INTERNAL MEDICINE

## 2020-10-06 PROCEDURE — G8419 CALC BMI OUT NRM PARAM NOF/U: HCPCS | Performed by: INTERNAL MEDICINE

## 2020-10-06 PROCEDURE — G8926 SPIRO NO PERF OR DOC: HCPCS | Performed by: INTERNAL MEDICINE

## 2020-10-06 PROCEDURE — G8400 PT W/DXA NO RESULTS DOC: HCPCS | Performed by: INTERNAL MEDICINE

## 2020-10-06 PROCEDURE — G8427 DOCREV CUR MEDS BY ELIG CLIN: HCPCS | Performed by: NURSE PRACTITIONER

## 2020-10-06 RX ORDER — FLUTICASONE FUROATE, UMECLIDINIUM BROMIDE AND VILANTEROL TRIFENATATE 100; 62.5; 25 UG/1; UG/1; UG/1
1 POWDER RESPIRATORY (INHALATION) DAILY
Qty: 2 EACH | Refills: 0
Start: 2020-10-06 | End: 2020-10-06 | Stop reason: SDUPTHER

## 2020-10-06 RX ORDER — ALPRAZOLAM 1 MG/1
1 TABLET ORAL NIGHTLY PRN
Qty: 30 TABLET | Refills: 0 | Status: CANCELLED | OUTPATIENT
Start: 2020-10-06 | End: 2020-11-05

## 2020-10-06 RX ORDER — PREDNISONE 20 MG/1
20 TABLET ORAL DAILY
Qty: 10 TABLET | Refills: 0 | Status: SHIPPED | OUTPATIENT
Start: 2020-10-06 | End: 2020-10-16

## 2020-10-06 RX ORDER — FLUTICASONE FUROATE, UMECLIDINIUM BROMIDE AND VILANTEROL TRIFENATATE 100; 62.5; 25 UG/1; UG/1; UG/1
1 POWDER RESPIRATORY (INHALATION) DAILY
Qty: 1 EACH | Refills: 5 | Status: SHIPPED | OUTPATIENT
Start: 2020-10-06 | End: 2020-10-06 | Stop reason: SDUPTHER

## 2020-10-06 RX ORDER — LISINOPRIL 10 MG/1
TABLET ORAL
COMMUNITY
Start: 2020-09-17 | End: 2020-10-06 | Stop reason: SDUPTHER

## 2020-10-06 RX ORDER — LISINOPRIL 10 MG/1
TABLET ORAL
Qty: 30 TABLET | Refills: 3 | Status: SHIPPED | OUTPATIENT
Start: 2020-10-06 | End: 2020-12-16 | Stop reason: SDUPTHER

## 2020-10-06 ASSESSMENT — PATIENT HEALTH QUESTIONNAIRE - PHQ9
SUM OF ALL RESPONSES TO PHQ QUESTIONS 1-9: 0
SUM OF ALL RESPONSES TO PHQ9 QUESTIONS 1 & 2: 0
2. FEELING DOWN, DEPRESSED OR HOPELESS: 0
SUM OF ALL RESPONSES TO PHQ QUESTIONS 1-9: 0
1. LITTLE INTEREST OR PLEASURE IN DOING THINGS: 0

## 2020-10-06 ASSESSMENT — ENCOUNTER SYMPTOMS: BACK PAIN: 1

## 2020-10-06 NOTE — PROGRESS NOTES
MA Communication:   The following orders are received by verbal communication from Robbin Macias MD    Orders include:  Samples Trelegy       PFT 6 min Walk       CT low dose screen       FU VV

## 2020-10-06 NOTE — PROGRESS NOTES
Chief Complaint/Referring Provider:  Pulmonary follow up    A virtual pulmonary visit was done for the patient to discuss the clinical status and options, patient states that she has been having some increased coughing along with that patient has respiratory secretions which are somewhat yellow now and they were clear before, patient states that when she bends to scoop up the dogs poop she starts having shortness of breath, patient states that her grandkids are staying with them and she is not able to do any testing and for the reason she must have forgotten about the test which were prescribed earlier, patient states that she is using Trelegy Ellipta every other day to conserve the inhaler, patient states that she also has been having wheezing, patient also states that she has to pace herself in order to have no more than usual shortness of breath, patient does not have any epistaxis or hemoptysis, patient does not have any abdominal symptoms of concern, patient does not have any increasing leg edema, patient still smokes whenever she gets nervous, patient does not have any confusion lethargy, patient states that she probably may have finally found a PCP and Dr. Adela Lora whom she is going to see soon, patient does not have any other pertinent review of system of concern    Previous  [facility] virtual pulmonary follow-up was done for the patient for her pulmonary issues, patient still has some cough with shortness of breath, patient states that she has some sinus congestion with rhinorrhea, patient does not have any increasing cough or expectoration which is more than usual, patient does not have any purulence in the secretions, no hemoptysis, patient does not have any epistaxis, patient states that she still has to use her rescue inhaler about 3 times a day, patient does not have any fever or chest pains, patient has lots of heartburns and patient states that she has not been taking any medication for the heartburns because she was told by her family and 1 of the medications is causing cancer, patient continues to be a smoker, patient states that her feet hurt a lot, patient does not have any change in the ambient environment, patient does not have any confusion lethargy, no other pertinent review of system of concern    Virtual pulmonary follow-up done, patient was seen few weeks back because of COPD exacerbation and was given a Z-Favio with some prednisone and patient states that after she took that she was feeling better better for few days then patient again is having increased shortness of breath and also having decreased activities of daily living, patient has been having respite secretions which are clear to yellow in color along with that patient also states that she has been having some increased wheezing, patient has been using Flonase and Singulair, patient does not have any fever or chills, patient does have some otalgia, patient also has some little sore throat, patient does not have any difficulty in swallowing, patient has been having increased chest tightness, patient does not have any significant abdominal symptoms but patient states that she feels tired and having no energy, patient states that her PCP had called in for nebulizer to [facility] but it has not been delivered and patient also wanted know whether she needs to use any oxygen or not, patient states that she had come to her family's house for self quarantine and had not brought her Flonase but will get tomorrow, patient does not have any confusion lethargy, patient continues to be a smoker, patient does not have any other pertinent review of system of concern      -A virtual pulmonary visit was made to discuss patient's pulmonary issues, patient states that she is not feeling well for last few days time, patient has been having some increased bronchitis, patient has been having increased cough with yellowish secretions along with that patient has have increasing rhinorrhea nasal congestion and postnasal drip drainage, patient does not have any epistaxis or hemoptysis,, patient states that she does not have any sore throat or difficulty in swallowing at this time, patient does not have any odynophagia or dysphagia, patient states that sometimes her right ear hurts and also it is feeling clogged, patient states that she has worsening shortness of breath on exertion and patient's excess tolerance is limited, patient does not have any fever or chills, patient does have reflux symptoms, patient still smokes cigarettes, patient does not have any dysuria or hematuria, patient does not have any hematochezia or melena, patient does not have any small joint pains, patient has occasional feet swelling of the feet, patient does not have any change in the ambient environment but it is quite alex patient states that her mom's house has not been clean or dusted for last 1 year or so, patient also states that there are dogs as pets and there is a lot of pet dander in the house, patient's family also had a cat till last year with diet but there was a lot of letter which was clean regularly, patient occasionally travels to [place] to look after her own home, patient does not have any significant change in the ambient environment except for the things which have been listed above, patient does not have any confusion or lethargy, patient also has chronic back pains for which she saw a pain specialist in the recent past and has been prescribed Percocet, patient does not have any other pertinent review of system of concern    Patient states that when she was in [place] her pulmonologist to give her Luisana Prateek but patient does not have it at this time and also patient states that she cannot afford it because the copayment on her medication is about $75 which is beyond her reach at this time.     Past Medical History:   Diagnosis Date    Hypertension        Past Surgical History:   Procedure Laterality Date    CARPAL TUNNEL RELEASE Bilateral     KNEE SURGERY Left     SINUS SURGERY      SKIN CANCER EXCISION      basal cell removal under left eye       Allergies   Allergen Reactions    Amoxicillin     Ampicillin        Medication list was reviewed and updated as needed in Epic    Social History     Socioeconomic History    Marital status:      Spouse name: Not on file    Number of children: Not on file    Years of education: Not on file    Highest education level: Not on file   Occupational History    Not on file   Social Needs    Financial resource strain: Not on file    Food insecurity     Worry: Not on file     Inability: Not on file    Transportation needs     Medical: Not on file     Non-medical: Not on file   Tobacco Use    Smoking status: Current Some Day Smoker     Packs/day: 0.25     Years: 40.00     Pack years: 10.00     Types: Cigarettes     Start date: 5/27/1973    Smokeless tobacco: Never Used    Tobacco comment: 3/11/20 - smokes 4-5   Substance and Sexual Activity    Alcohol use: No    Drug use: No    Sexual activity: Never   Lifestyle    Physical activity     Days per week: Not on file     Minutes per session: Not on file    Stress: Not on file   Relationships    Social connections     Talks on phone: Not on file     Gets together: Not on file     Attends [employer] service: Not on file     Active member of club or organization: Not on file     Attends meetings of clubs or organizations: Not on file     Relationship status: Not on file    Intimate partner violence     Fear of current or ex partner: Not on file     Emotionally abused: Not on file     Physically abused: Not on file     Forced sexual activity: Not on file   Other Topics Concern    Not on file   Social History Narrative    Not on file       History reviewed. No pertinent family history.          Review of Systems same as above    Physical Exam:  not currently breastfeeding.'  Constitutional:  No acute distress. HENT:  Oropharynx is clear and moist. No thyromegaly. Nasal mucosal hyperemia and congestion along with posterior pharyngeal wall cobbling  Eyes:  Conjunctivae arenormal. Pupils equal, round, and reactive to light. No scleral icterus. Neck: . No tracheal deviation present. No obvious thyroid mass. Cardiovascular:Normal rate, regular rhythm, normal heart sounds. No right ventricular heave. Nolower extremity edema. Pulmonary/Chest: No wheezes. No rales. Chest wall is not dull to percussion. Noaccessory muscle usage or stridor. Decreased breath sound intensity  Abdominal: Soft. Bowel sounds present. No distension or hernia. Notenderness. Abdominal binder present  Musculoskeletal: No cyanosis. No clubbing. No obvious joint deformity. Lymphadenopathy: No cervical or supraclavicular adenopathy. Skin: Skin is warm and dry. No rash or nodules on the exposed extremities. Psychiatric: Normal mood and affect. Behavior is normal.  No anxiety. Neurologic: Alert, awake and oriented. PERRL. Speech fluent    (NOTE DONE)    Data:     Imaging:  I have reviewed radiology images personally. No orders to display           Assessment:    1. Simple chronic bronchitis ([address] with exacerbation        2. Chronic allergic rhinitis        3.  Personal history of tobacco use          Plan:   · Patient's review of system were discussed   · patient was told about the pathophysiology of the disease process and its modifying factors  · Patient was told that it is imperative for her to stop smoking otherwise she will have increasing morbidity and currently  · Patient was told about the various modalities to quit smoking but patient is not ready for commitment at this time  · Patient has multiple social issues at this time which may preclude her from not smoking as per the patient  · Patient needs to have a baseline PFT which is being ordered  · Patient should also get a 6-minute walk test to see if she has any exertional hypoxemia-to be done as patient has not done it before  · Patient also was told that she needs to have a low-dose CT of the chest for cancer screening and after mutual discussion a decision was made to do it which is being ordered-to be done now as patient had forgotten about it  · Patient is agreeable for testing -will schedule again  · Patient was told that hurting of her feet can be secondary to PVD which can be secondary to smiking   · Patient also has increased nasal congestion and chronic allergic rhinitis  · Patient was told to try some saline nasal spray over-the-counter  · Stimulation at nighttime along with humidifier in the bedroom may help  · Given that patient has multiple exposures to animal danders a HEPA filter may be helpful if she can afford  · Patient to continue with Patel Leyland and patient is going to get her medication today from the pharmacy has been told by the patient-patient was told about the limitations of using the Trelegy Ellipta every other day  · Prescription for Patel Leyland was sent to her pharmacy again as the pharmacy has put back the inhalers on the shelf as per the patient  · Patient to be given prednisone 20 mg once a day in the morning for 10 days  · Patient appears to have some acute exacerbation of COPD at this time  · Levaquin and medrol dosepak ordered  · Patient needs to get a primary care provider to be the noted person for her care  · Smoking cessation reinforced  · Patient will benefit from a flu shot and pneumonia vaccine-will think about it and decide on next visit  · Smoking cessation reinforced  · Patient to take albuterol inhaler on whenever necessary basis  · Patient was told about diet and lifestyle modifications patient in view of that patient has reflux symptoms  · Patient should avoid taking narcotics and sedatives and the effect causing respiratory depression were discussed  · Patient was told to maintain

## 2020-10-06 NOTE — PROGRESS NOTES
10/6/2020    TELEHEALTH EVALUATION -- Audio/Visual (During XSZNT-70 public health emergency)    HPI:    Kash Jerez (:  1950) has requested an audio/video evaluation for the following concern(s):establish care    Ms Muna Snyder is a 79 yo pt of Dr Pattie May in [place]. She is not able to travel that far any longer. She moved here to be near her family. She is able to provide a ROS w specifics on her anxiety disorder. She has been on xanax since she was 30 yo and states\"they work well and help her feel normal\". She was seen by Dr Kiki rFaire for HTN and most recently in   seen by Dr Giulia Pringle for her COPD. I am not exactly who sent in the referral but perhaps her physician in [place] sent it in when they knew she was relocating     Review of Systems   Respiratory:        Hx COPD,asthma Positive smoker-CAROLINE, Trelegy and flonase. Dr Giulia Pringle   Cardiovascular:        HTN controlled w lisinopril   Gastrointestinal:        GERD-PPI   Musculoskeletal: Positive for arthralgias, back pain and myalgias. Multiple joint replacements including RCT    arthritis   Skin: Positive for rash. Hx of basal cell carcinoma on face had lesions removed. Would like a  New Derm ref since new areas are popping up   Psychiatric/Behavioral: The patient is nervous/anxious. Anxiety disorder hs been on xanax since age 29       Prior to Visit Medications    Medication Sig Taking?  Authorizing Provider   lisinopril (PRINIVIL;ZESTRIL) 10 MG tablet TK 1 T PO BID Yes Historical Provider, MD   predniSONE (DELTASONE) 20 MG tablet Take 1 tablet by mouth daily for 10 days Yes Dolly Miller MD   fluticasone (FLONASE) 50 MCG/ACT nasal spray 1 spray by Each Nostril route daily Yes Dolly Miller MD   fluticasone-umeclidin-vilant (TRELEGY ELLIPTA) 100-62.5-25 MCG/INH AEPB Inhale 1 puff into the lungs daily Yes Dolly Miller MD   Cholecalciferol (VITAMIN D3) 17356 units CAPS Take by mouth Yes Historical Provider, MD   albuterol sulfate  (90 Base) MCG/ACT inhaler Inhale 2 puffs into the lungs every 6 hours as needed for Wheezing Yes Historical Provider, MD   aspirin 81 MG tablet Take 81 mg by mouth daily Yes Historical Provider, MD   DiphenhydrAMINE HCl (ALLERGY MED PO) Take by mouth Yes Historical Provider, MD   omeprazole (PRILOSEC) 20 MG delayed release capsule TAKE 1 CAPSULE BY MOUTH TWICE DAILY BEFORE MEALS  Patient not taking: Reported on 10/6/2020  Juanito Lazcano MD   ALPRAZolam Lamount Salon) 1 MG tablet Take 1 mg by mouth nightly as needed for Sleep. Historical Provider, MD       Social History     Tobacco Use    Smoking status: Current Every Day Smoker     Packs/day: 0.25     Years: 40.00     Pack years: 10.00     Types: Cigarettes     Start date: 5/27/1973    Smokeless tobacco: Never Used    Tobacco comment: 2-3 cigs a day   Substance Use Topics    Alcohol use: No    Drug use: No            PHYSICAL EXAMINATION:  [ INSTRUCTIONS:  \"[x]\" Indicates a positive item  \"[]\" Indicates a negative item  -- DELETE ALL ITEMS NOT EXAMINED]  Vital Signs: (As obtained by patient/caregiver or practitioner observation)    Blood pressure-  Heart rate-    Respiratory rate-    Temperature-  Pulse oximetry-     Constitutional: [x] Appears well-developed and well-nourished [x] No apparent distress      [] Abnormal-   Mental status  [x] Alert and awake  [x] Oriented to person/place/time [x]Able to follow commands      Eyes:  EOM    [x]  Normal  [] Abnormal-  Sclera  [x]  Normal  [] Abnormal -         Discharge []  None visible  [] Abnormal -    HENT:   [x] Normocephalic, atraumatic.   [] Abnormal   [] Mouth/Throat: Mucous membranes are moist.     External Ears [x] Normal  [] Abnormal-     Neck: [x] No visualized mass     Pulmonary/Chest: [x] Respiratory effort normal.  [x] No visualized signs of difficulty breathing or respiratory distress        [] Abnormal-      Musculoskeletal:   [] Normal gait with no signs of ataxia         [x] Normal range of motion of neck        [] Abnormal-       Neurological:        [] No Facial Asymmetry (Cranial nerve 7 motor function) (limited exam to video visit)          [x] No gaze palsy        [] Abnormal-         Skin:        [x] No significant exanthematous lesions or discoloration noted on facial skin         [] Abnormal-            Psychiatric:       [x] Normal Affect [] No Hallucinations        [] Abnormal-     Other pertinent observable physical exam findings-     ASSESSMENT/PLAN:  1.establish care/physical.labs reviewed process to establish care for this practice. Physical and labs   2. COPD-under the care of Dr Sarah Beckwith albuterol and trelegy  3. Basal cell carcinoma have new areas on face derm ref  4.anxiety-will have physical and the provider will assess her need for xanax. I will not refill without being seen      Radha Holly is a 78 y.o. female being evaluated by a Virtual Visit (video visit) encounter to address concerns as mentioned above. A caregiver was present when appropriate. Due to this being a TeleHealth encounter (During ZZMOS-70 public health emergency), evaluation of the following organ systems was limited: Vitals/Constitutional/EENT/Resp/CV/GI//MS/Neuro/Skin/Heme-Lymph-Imm. Pursuant to the emergency declaration under the [address] [place] [address] authority and the [employer] and [employer] Act, this Virtual Visit was conducted with patient's (and/or legal guardian's) consent, to reduce the patient's risk of exposure to COVID-19 and provide necessary medical care. The patient (and/or legal guardian) has also been advised to contact this office for worsening conditions or problems, and seek emergency medical treatment and/or call 911 if deemed necessary.      Patient identification was verified at the start of the visit: Yes    Total time spent on this encounter: 39    Services were provided through a video synchronous discussion virtually to substitute for in-person clinic visit. Patient and provider were located at their individual homes. --BALDOMERO Ward CNP on 10/6/2020 at 3:13 PM    An electronic signature was used to authenticate this note.

## 2020-10-07 NOTE — PATIENT INSTRUCTIONS
Establish care w physical and lab work  Refer to Vicky Martins for possible basal cell on face  Anxiety assess the need-has been on xanax since she was 32?     Copd-inhalers managed Dr Jailyn maguire joint problems may need ortho

## 2020-10-12 ENCOUNTER — TELEPHONE (OUTPATIENT)
Dept: FAMILY MEDICINE CLINIC | Age: 70
End: 2020-10-12

## 2020-10-12 ENCOUNTER — NURSE ONLY (OUTPATIENT)
Dept: FAMILY MEDICINE CLINIC | Age: 70
End: 2020-10-12

## 2020-10-12 VITALS — TEMPERATURE: 98.9 F

## 2020-10-12 LAB
BASOPHILS ABSOLUTE: 0.1 K/UL (ref 0–0.2)
BASOPHILS RELATIVE PERCENT: 0.8 %
EOSINOPHILS ABSOLUTE: 0.1 K/UL (ref 0–0.6)
EOSINOPHILS RELATIVE PERCENT: 0.8 %
HCT VFR BLD CALC: 42.6 % (ref 36–48)
HEMOGLOBIN: 14.3 G/DL (ref 12–16)
LYMPHOCYTES ABSOLUTE: 3.3 K/UL (ref 1–5.1)
LYMPHOCYTES RELATIVE PERCENT: 34.9 %
MCH RBC QN AUTO: 31.9 PG (ref 26–34)
MCHC RBC AUTO-ENTMCNC: 33.6 G/DL (ref 31–36)
MCV RBC AUTO: 95.1 FL (ref 80–100)
MONOCYTES ABSOLUTE: 1 K/UL (ref 0–1.3)
MONOCYTES RELATIVE PERCENT: 11.2 %
NEUTROPHILS ABSOLUTE: 4.9 K/UL (ref 1.7–7.7)
NEUTROPHILS RELATIVE PERCENT: 52.3 %
PDW BLD-RTO: 14.1 % (ref 12.4–15.4)
PLATELET # BLD: 279 K/UL (ref 135–450)
PMV BLD AUTO: 8.2 FL (ref 5–10.5)
RBC # BLD: 4.48 M/UL (ref 4–5.2)
WBC # BLD: 9.4 K/UL (ref 4–11)

## 2020-10-12 NOTE — TELEPHONE ENCOUNTER
We got a message that the labs will not be covered with the current Dx for the labs. Please add additional ICD-10 codes to lab orders and send to me to reprint. Thank you.

## 2020-10-13 ENCOUNTER — TELEPHONE (OUTPATIENT)
Dept: FAMILY MEDICINE CLINIC | Age: 70
End: 2020-10-13

## 2020-10-13 LAB
A/G RATIO: 2.2 (ref 1.1–2.2)
ALBUMIN SERPL-MCNC: 4.8 G/DL (ref 3.4–5)
ALP BLD-CCNC: 85 U/L (ref 40–129)
ALT SERPL-CCNC: 13 U/L (ref 10–40)
ANION GAP SERPL CALCULATED.3IONS-SCNC: 14 MMOL/L (ref 3–16)
AST SERPL-CCNC: 16 U/L (ref 15–37)
BILIRUB SERPL-MCNC: 0.3 MG/DL (ref 0–1)
BUN BLDV-MCNC: 13 MG/DL (ref 7–20)
CALCIUM SERPL-MCNC: 10.4 MG/DL (ref 8.3–10.6)
CHLORIDE BLD-SCNC: 102 MMOL/L (ref 99–110)
CHOLESTEROL, FASTING: 233 MG/DL (ref 0–199)
CO2: 28 MMOL/L (ref 21–32)
CREAT SERPL-MCNC: 0.9 MG/DL (ref 0.6–1.2)
GFR AFRICAN AMERICAN: >60
GFR NON-AFRICAN AMERICAN: >60
GLOBULIN: 2.2 G/DL
GLUCOSE BLD-MCNC: 99 MG/DL (ref 70–99)
HDLC SERPL-MCNC: 61 MG/DL (ref 40–60)
LDL CHOLESTEROL CALCULATED: 135 MG/DL
POTASSIUM SERPL-SCNC: 4.6 MMOL/L (ref 3.5–5.1)
SODIUM BLD-SCNC: 144 MMOL/L (ref 136–145)
TOTAL PROTEIN: 7 G/DL (ref 6.4–8.2)
TRIGLYCERIDE, FASTING: 186 MG/DL (ref 0–150)
TSH REFLEX FT4: 0.96 UIU/ML (ref 0.27–4.2)
VITAMIN D 25-HYDROXY: 24.3 NG/ML
VITAMIN D 25-HYDROXY: 27.8 NG/ML
VLDLC SERPL CALC-MCNC: 37 MG/DL

## 2020-10-13 RX ORDER — ERGOCALCIFEROL 1.25 MG/1
50000 CAPSULE ORAL WEEKLY
Qty: 12 CAPSULE | Refills: 0 | Status: SHIPPED | OUTPATIENT
Start: 2020-10-13 | End: 2020-10-13

## 2020-10-13 RX ORDER — ERGOCALCIFEROL 1.25 MG/1
CAPSULE ORAL
Qty: 13 CAPSULE | Refills: 0 | Status: SHIPPED | OUTPATIENT
Start: 2020-10-13 | End: 2020-10-19 | Stop reason: ALTCHOICE

## 2020-10-15 ENCOUNTER — OFFICE VISIT (OUTPATIENT)
Dept: PRIMARY CARE CLINIC | Age: 70
End: 2020-10-15
Payer: MEDICARE

## 2020-10-15 ENCOUNTER — TELEPHONE (OUTPATIENT)
Dept: FAMILY MEDICINE CLINIC | Age: 70
End: 2020-10-15

## 2020-10-15 PROCEDURE — G8428 CUR MEDS NOT DOCUMENT: HCPCS | Performed by: NURSE PRACTITIONER

## 2020-10-15 PROCEDURE — G8419 CALC BMI OUT NRM PARAM NOF/U: HCPCS | Performed by: NURSE PRACTITIONER

## 2020-10-15 PROCEDURE — 99211 OFF/OP EST MAY X REQ PHY/QHP: CPT | Performed by: NURSE PRACTITIONER

## 2020-10-15 NOTE — TELEPHONE ENCOUNTER
----- Message from Jayy Banuelos sent at 10/14/2020  5:16 PM EDT -----  Subject: Message to Provider    QUESTIONS  Information for Provider? Patient would like to be referred to a   pediatrist    orthopedic doctor   and the patient also states that she has compound fractures in her back   from bottom back up to neck   and she has osteoporosis. Patient has anxiety and doesn't have any   medication.   ---------------------------------------------------------------------------  --------------  CALL BACK INFO  What is the best way for the office to contact you? OK to leave message on   voicemail  Preferred Call Back Phone Number? [phone]  ---------------------------------------------------------------------------  --------------  SCRIPT ANSWERS  Relationship to Patient?  Self

## 2020-10-15 NOTE — PROGRESS NOTES
Tish Atkins received a viral test for COVID-19. They were educated on isolation and quarantine as appropriate. For any symptoms, they were directed to seek care from their PCP, given contact information to establish with a doctor, directed to an urgent care or the emergency room.

## 2020-10-16 LAB — SARS-COV-2, NAA: NOT DETECTED

## 2020-10-19 ENCOUNTER — VIRTUAL VISIT (OUTPATIENT)
Dept: FAMILY MEDICINE CLINIC | Age: 70
End: 2020-10-19
Payer: MEDICARE

## 2020-10-19 PROCEDURE — 4040F PNEUMOC VAC/ADMIN/RCVD: CPT | Performed by: NURSE PRACTITIONER

## 2020-10-19 PROCEDURE — G8400 PT W/DXA NO RESULTS DOC: HCPCS | Performed by: NURSE PRACTITIONER

## 2020-10-19 PROCEDURE — G8484 FLU IMMUNIZE NO ADMIN: HCPCS | Performed by: NURSE PRACTITIONER

## 2020-10-19 PROCEDURE — 1123F ACP DISCUSS/DSCN MKR DOCD: CPT | Performed by: NURSE PRACTITIONER

## 2020-10-19 PROCEDURE — G8419 CALC BMI OUT NRM PARAM NOF/U: HCPCS | Performed by: NURSE PRACTITIONER

## 2020-10-19 PROCEDURE — 99213 OFFICE O/P EST LOW 20 MIN: CPT | Performed by: NURSE PRACTITIONER

## 2020-10-19 PROCEDURE — 1090F PRES/ABSN URINE INCON ASSESS: CPT | Performed by: NURSE PRACTITIONER

## 2020-10-19 PROCEDURE — 4004F PT TOBACCO SCREEN RCVD TLK: CPT | Performed by: NURSE PRACTITIONER

## 2020-10-19 PROCEDURE — G8427 DOCREV CUR MEDS BY ELIG CLIN: HCPCS | Performed by: NURSE PRACTITIONER

## 2020-10-19 PROCEDURE — 3017F COLORECTAL CA SCREEN DOC REV: CPT | Performed by: NURSE PRACTITIONER

## 2020-10-19 RX ORDER — PAROXETINE HYDROCHLORIDE 20 MG/1
20 TABLET, FILM COATED ORAL DAILY
Qty: 30 TABLET | Refills: 3 | Status: SHIPPED | OUTPATIENT
Start: 2020-10-19 | End: 2020-10-23 | Stop reason: CLARIF

## 2020-10-19 ASSESSMENT — PATIENT HEALTH QUESTIONNAIRE - PHQ9
SUM OF ALL RESPONSES TO PHQ QUESTIONS 1-9: 0
SUM OF ALL RESPONSES TO PHQ9 QUESTIONS 1 & 2: 0
SUM OF ALL RESPONSES TO PHQ QUESTIONS 1-9: 0
2. FEELING DOWN, DEPRESSED OR HOPELESS: 0
SUM OF ALL RESPONSES TO PHQ QUESTIONS 1-9: 0
SUM OF ALL RESPONSES TO PHQ QUESTIONS 1-9: 0
SUM OF ALL RESPONSES TO PHQ9 QUESTIONS 1 & 2: 0
2. FEELING DOWN, DEPRESSED OR HOPELESS: 0
1. LITTLE INTEREST OR PLEASURE IN DOING THINGS: 0
1. LITTLE INTEREST OR PLEASURE IN DOING THINGS: 0

## 2020-10-19 ASSESSMENT — LIFESTYLE VARIABLES
HOW OFTEN DO YOU HAVE A DRINK CONTAINING ALCOHOL: NEVER
HOW OFTEN DO YOU HAVE A DRINK CONTAINING ALCOHOL: 0
AUDIT-C TOTAL SCORE: INCOMPLETE
AUDIT TOTAL SCORE: INCOMPLETE

## 2020-10-19 NOTE — PROGRESS NOTES
10/19/2020    TELEHEALTH EVALUATION -- Audio/Visual (During UOUDX-23 public health emergency)    HPI:    Charles Retana (:  1950) has requested an audio/video evaluation for the following concern(s):review labs have a physical. She was schedule for a MWV  Unclear how she ended back on my virtual list.  Since she has transferred from [place] I do not have any records of her PMH. Pt is going on about her anxiety and need for xanax as well as her bone problems. She states she fractured something in her lumbar spin but did not have the money to have it fixed. 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 I explained to the pt that without her old records I would have to start form the beginning, so I ordered LS xrays as well as a DEXA scan. Explained how and where to go for these tests. She wrote everything down an dsid she was grateful for the help. She may need a ref to a psychiatrist  Positive smoking hx        Review of Systems   Constitutional: Negative. Respiratory: Negative. Mils asthma- trelegy, kobi    Cardiovascular: Negative. HTN controlled w lisinopril/hctz   Gastrointestinal:        GERD-PPI   Psychiatric/Behavioral: Positive for behavioral problems and sleep disturbance. The patient is nervous/anxious (xanax). Depression-paxil started today       Prior to Visit Medications    Medication Sig Taking?  Authorizing Provider   Calcium Carbonate Antacid (TUMS PO) Take by mouth Yes Historical Provider, MD   PARoxetine (PAXIL) 20 MG tablet Take 1 tablet by mouth daily Yes BALDOMERO Cotter CNP   lisinopril (PRINIVIL;ZESTRIL) 10 MG tablet TK 1 T PO BID Yes Rubin AlondraBALDOMERO Fernández CNP   fluticasone-umeclidin-vilant (TRELEGY ELLIPTA) 100-62.5-25 MCG/INH AEPB Inhale 1 puff into the lungs daily Yes Robbin Macias MD   Cholecalciferol (VITAMIN D3) 49263 units CAPS Take by mouth Yes Historical Provider, MD   albuterol sulfate HFA 108 (90 Base) MCG/ACT inhaler Inhale 2 puffs into the lungs every 6 hours as needed for Wheezing Yes Historical Provider, MD   aspirin 81 MG tablet Take 81 mg by mouth daily Yes Historical Provider, MD   DiphenhydrAMINE HCl (ALLERGY MED PO) Take by mouth Yes Historical Provider, MD   omeprazole (PRILOSEC) 20 MG delayed release capsule TAKE 1 CAPSULE BY MOUTH TWICE DAILY BEFORE MEALS  Patient not taking: Reported on 10/6/2020  Tashi Garcia MD   ALPRAZolam Alvia Kristina) 1 MG tablet Take 1 mg by mouth nightly as needed for Sleep. Historical Provider, MD   fluticasone (FLONASE) 50 MCG/ACT nasal spray 1 spray by Each Nostril route daily  Patient not taking: Reported on 10/19/2020  Sandra Davis MD       Social History     Tobacco Use    Smoking status: Current Every Day Smoker     Packs/day: 0.25     Years: 40.00     Pack years: 10.00     Types: Cigarettes     Start date: 5/27/1973    Smokeless tobacco: Never Used    Tobacco comment: 2-3 cigs a day   Substance Use Topics    Alcohol use: No    Drug use: No            PHYSICAL EXAMINATION:  [ INSTRUCTIONS:  \"[x]\" Indicates a positive item  \"[]\" Indicates a negative item  -- DELETE ALL ITEMS NOT EXAMINED]  Vital Signs: (As obtained by patient/caregiver or practitioner observation)    Blood pressure-  Heart rate-    Respiratory rate-    Temperature-  Pulse oximetry-     Constitutional: [x] Appears well-developed and well-nourished [x] No apparent distress      [] Abnormal-   Mental status  [x] Alert and awake  [x] Oriented to person/place/time [x]Able to follow commands      Eyes:  EOM    [x]  Normal  [] Abnormal-  Sclera  []  Normal  [] Abnormal -         Discharge []  None visible  [] Abnormal -    HENT:   [x] Normocephalic, atraumatic.   [x] Abnormal   [] Mouth/Throat: Mucous membranes are moist.     External Ears [x] Normal  [] Abnormal-     Neck: [x] No visualized mass     Pulmonary/Chest: [x] Respiratory effort normal.  [x] No visualized signs of difficulty breathing or respiratory distress        [] Abnormal-      Musculoskeletal:   [] Normal gait with no signs of ataxia         [x] Normal range of motion of neck        [] Abnormal-       Neurological:        [x] No Facial Asymmetry (Cranial nerve 7 motor function) (limited exam to video visit)          [x] No gaze palsy        [] Abnormal-         Skin:        [x] No significant exanthematous lesions or discoloration noted on facial skin         [] Abnormal-            Psychiatric:       [x] Normal Affect [] No Hallucinations        [] Abnormal-     Other pertinent observable physical exam findings-     ASSESSMENT/PLAN:      No follow-ups on file. Eunice Andrea is a 78 y.o. female being evaluated by a Virtual Visit (video visit) encounter to address concerns as mentioned above. A caregiver was present when appropriate. Due to this being a TeleHealth encounter (During SSPEV-59 public health emergency), evaluation of the following organ systems was limited: Vitals/Constitutional/EENT/Resp/CV/GI//MS/Neuro/Skin/Heme-Lymph-Imm. Pursuant to the emergency declaration under the [address] [place] [address] authority and the [employer] and [employer] Act, this Virtual Visit was conducted with patient's (and/or legal guardian's) consent, to reduce the patient's risk of exposure to COVID-19 and provide necessary medical care. The patient (and/or legal guardian) has also been advised to contact this office for worsening conditions or problems, and seek emergency medical treatment and/or call 911 if deemed necessary. Patient identification was verified at the start of the visit: Yes    Total time spent on this encounter: 22    Services were provided through a video synchronous discussion virtually to substitute for in-person clinic visit. Patient and provider were located at their individual homes.     --BALDOMERO Spaulding - CNP on 10/19/2020 at 2:36 PM    An electronic signature was used to authenticate this note.

## 2020-10-19 NOTE — RESULT ENCOUNTER NOTE

## 2020-10-20 ASSESSMENT — ENCOUNTER SYMPTOMS: RESPIRATORY NEGATIVE: 1

## 2020-10-23 ENCOUNTER — TELEPHONE (OUTPATIENT)
Dept: PULMONOLOGY | Age: 70
End: 2020-10-23

## 2020-10-29 ENCOUNTER — NURSE TRIAGE (OUTPATIENT)
Dept: OTHER | Facility: CLINIC | Age: 70
End: 2020-10-29
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
## 2020-11-02 NOTE — TELEPHONE ENCOUNTER
Appointments made. Asked pt. If she decided not to keep appointments to please call and cancel so another pt. Could take the times. Voiced understanding.

## 2020-11-12 ENCOUNTER — TELEPHONE (OUTPATIENT)
Dept: PULMONOLOGY | Age: 70
End: 2020-11-12

## 2020-11-12 RX ORDER — DOXYCYCLINE HYCLATE 100 MG
100 TABLET ORAL 2 TIMES DAILY
Qty: 14 TABLET | Refills: 0 | Status: CANCELLED | OUTPATIENT
Start: 2020-11-12 | End: 2020-11-19

## 2020-11-12 NOTE — TELEPHONE ENCOUNTER
Productive cough clear to yellow thick. No temp chest pain. Wheezing Feels really bad no energy. Wants to know if you will call something in.  Wants Prednisone and antihistimine

## 2020-11-13 RX ORDER — DOXYCYCLINE HYCLATE 100 MG
100 TABLET ORAL 2 TIMES DAILY
Qty: 14 TABLET | Refills: 0 | Status: SHIPPED | OUTPATIENT
Start: 2020-11-13 | End: 2020-11-20

## 2020-11-23 RX ORDER — PREDNISONE 10 MG/1
TABLET ORAL
Qty: 35 TABLET | Refills: 0 | Status: SHIPPED | OUTPATIENT
Start: 2020-11-23 | End: 2020-12-08

## 2020-11-23 NOTE — TELEPHONE ENCOUNTER
Pt called back today stating Doxycycline Makes her sick so she did not take any of this prescription. She would like for Prednisone to be called in instead. She said she is still having all the same symptoms as she was on 11/12/2020    Please advise?

## 2020-11-30 ENCOUNTER — TELEPHONE (OUTPATIENT)
Dept: PULMONOLOGY | Age: 70
End: 2020-11-30

## 2020-12-01 NOTE — TELEPHONE ENCOUNTER
Pt. Will call back to schedule after the first of the year. Does not want to have test due to COVID.  FYI

## 2020-12-10 PROBLEM — M51.369 LUMBAR DEGENERATIVE DISC DISEASE: Status: ACTIVE | Noted: 2020-12-10

## 2020-12-10 PROBLEM — M51.36 LUMBAR DEGENERATIVE DISC DISEASE: Status: ACTIVE | Noted: 2020-12-10

## 2020-12-16 RX ORDER — LISINOPRIL 10 MG/1
TABLET ORAL
Qty: 30 TABLET | Refills: 3 | Status: SHIPPED | OUTPATIENT
Start: 2020-12-16 | End: 2021-03-02 | Stop reason: SDUPTHER

## 2020-12-16 NOTE — TELEPHONE ENCOUNTER
Future Appointments   Date Time Provider Mark Sutherland   12/18/2020 11:00 AM BALDOMERO Ba - CNP [facility]     LOV 10/19/2020

## 2020-12-18 ENCOUNTER — VIRTUAL VISIT (OUTPATIENT)
Dept: FAMILY MEDICINE CLINIC | Age: 70
End: 2020-12-18
Payer: MEDICARE

## 2020-12-18 PROCEDURE — 99213 OFFICE O/P EST LOW 20 MIN: CPT | Performed by: NURSE PRACTITIONER

## 2020-12-18 PROCEDURE — 1123F ACP DISCUSS/DSCN MKR DOCD: CPT | Performed by: NURSE PRACTITIONER

## 2020-12-18 PROCEDURE — G8400 PT W/DXA NO RESULTS DOC: HCPCS | Performed by: NURSE PRACTITIONER

## 2020-12-18 PROCEDURE — 4040F PNEUMOC VAC/ADMIN/RCVD: CPT | Performed by: NURSE PRACTITIONER

## 2020-12-18 PROCEDURE — 1090F PRES/ABSN URINE INCON ASSESS: CPT | Performed by: NURSE PRACTITIONER

## 2020-12-18 PROCEDURE — 3017F COLORECTAL CA SCREEN DOC REV: CPT | Performed by: NURSE PRACTITIONER

## 2020-12-18 PROCEDURE — G8427 DOCREV CUR MEDS BY ELIG CLIN: HCPCS | Performed by: NURSE PRACTITIONER

## 2020-12-18 ASSESSMENT — PATIENT HEALTH QUESTIONNAIRE - PHQ9
SUM OF ALL RESPONSES TO PHQ QUESTIONS 1-9: 0
2. FEELING DOWN, DEPRESSED OR HOPELESS: 0
SUM OF ALL RESPONSES TO PHQ9 QUESTIONS 1 & 2: 0
SUM OF ALL RESPONSES TO PHQ QUESTIONS 1-9: 0
1. LITTLE INTEREST OR PLEASURE IN DOING THINGS: 0
SUM OF ALL RESPONSES TO PHQ QUESTIONS 1-9: 0

## 2020-12-18 ASSESSMENT — ENCOUNTER SYMPTOMS
WHEEZING: 0
COUGH: 1
SHORTNESS OF BREATH: 0

## 2020-12-18 NOTE — PROGRESS NOTES
fluticasone (FLONASE) 50 MCG/ACT nasal spray 1 spray by Each Nostril route daily Yes Tasneem Betancourt MD   fluticasone-umeclidin-vilant (TRELEGY ELLIPTA) 100-62.5-25 MCG/INH AEPB Inhale 1 puff into the lungs daily Yes Tasneem Betancourt MD   albuterol sulfate  (90 Base) MCG/ACT inhaler Inhale 2 puffs into the lungs every 6 hours as needed for Wheezing Yes Historical Provider, MD   aspirin 81 MG tablet Take 81 mg by mouth daily Yes Historical Provider, MD   omeprazole (PRILOSEC) 20 MG delayed release capsule TAKE 1 CAPSULE BY MOUTH TWICE DAILY BEFORE MEALS  Patient not taking: Reported on 12/18/2020  Greg Chu MD       Social History     Tobacco Use    Smoking status: Current Every Day Smoker     Packs/day: 0.25     Years: 40.00     Pack years: 10.00     Types: Cigarettes     Start date: 5/27/1973    Smokeless tobacco: Never Used    Tobacco comment: 2-3 cigs a day   Substance Use Topics    Alcohol use: No    Drug use: No            PHYSICAL EXAMINATION:  [ INSTRUCTIONS:  \"[x]\" Indicates a positive item  \"[]\" Indicates a negative item  -- DELETE ALL ITEMS NOT EXAMINED]  Vital Signs: (As obtained by patient/caregiver or practitioner observation)    Blood pressure-  Heart rate-    Respiratory rate-    Temperature-  Pulse oximetry-     Constitutional: [x] Appears well-developed and well-nourished [] No apparent distress      [x] Abnormal- pacing outside of the house so the children wont hear her  Mental status  [x] Alert and awake  [x] Oriented to person/place/time [x]Able to follow commands      Eyes:  EOM    []  Normal  [] Abnormal-  Sclera  [x]  Normal  [] Abnormal -         Discharge [x]  None visible  [] Abnormal -    HENT:   [x] Normocephalic, atraumatic.   [] Abnormal   [] Mouth/Throat: Mucous membranes are moist.     External Ears [] Normal  [] Abnormal-     Neck: [] No visualized mass Pulmonary/Chest: [x] Respiratory effort normal.  [x] No visualized signs of difficulty breathing or respiratory distress        [] Abnormal-      Musculoskeletal:   [] Normal gait with no signs of ataxia         [x] Normal range of motion of neck        [] Abnormal-       Neurological:        [] No Facial Asymmetry (Cranial nerve 7 motor function) (limited exam to video visit)          [x] No gaze palsy        [] Abnormal-         Skin:        [x] No significant exanthematous lesions or discoloration noted on facial skin         [] Abnormal-            Psychiatric:       [x] Normal Affect [x] No Hallucinations        [] Abnormal-     Other pertinent observable physical exam findings-     ASSESSMENT/PLAN:  1. [facility] wellness, psychiatry. Refuses ssri wants tracie Bradford is a 78 y.o. female being evaluated by a Virtual Visit (video visit) encounter to address concerns as mentioned above. A caregiver was present when appropriate. Due to this being a TeleHealth encounter ([facility] emergency), evaluation of the following organ systems was limited: Vitals/Constitutional/EENT/Resp/CV/GI//MS/Neuro/Skin/Heme-Lymph-Imm. Pursuant to the emergency declaration under the [address] [place] [address], [address] [place] [address] authority and the [employer] and [employer] Act, this Virtual Visit was conducted with patient's (and/or legal guardian's) consent, to reduce the patient's risk of exposure to COVID-19 and provide necessary medical care. The patient (and/or legal guardian) has also been advised to contact this office for worsening conditions or problems, and seek emergency medical treatment and/or call 911 if deemed necessary.      Patient identification was verified at the start of the visit: yes    Total time spent on this encounter: 15 Services were provided through a video synchronous discussion virtually to substitute for in-person clinic visit. Patient and provider were located at their individual homes. --BALDOMERO Womack CNP on 12/18/2020 at 11:20 AM    An electronic signature was used to authenticate this note.

## 2020-12-18 NOTE — PATIENT INSTRUCTIONS
[facility] psychiatry ordered phone #[phone] I will call patient and give her the information.

## 2021-03-01 ENCOUNTER — TELEPHONE (OUTPATIENT)
Dept: PULMONOLOGY | Age: 71
End: 2021-03-01

## 2021-03-01 DIAGNOSIS — J41.0 SIMPLE CHRONIC BRONCHITIS (HCC): Primary | ICD-10-CM

## 2021-03-01 RX ORDER — PREDNISONE 20 MG/1
20 TABLET ORAL DAILY
Qty: 10 TABLET | Refills: 0 | Status: SHIPPED | OUTPATIENT
Start: 2021-03-01 | End: 2021-03-11

## 2021-03-01 RX ORDER — DOXYCYCLINE HYCLATE 100 MG
100 TABLET ORAL 2 TIMES DAILY
Qty: 14 TABLET | Refills: 0 | Status: SHIPPED | OUTPATIENT
Start: 2021-03-01 | End: 2021-03-08

## 2021-03-01 NOTE — TELEPHONE ENCOUNTER
Pt calling in. Has been congested and coughing. Pt stated that previously Dr. Denae Delatorre had prescribed her a Decongestant, Anti histamine and Prednisone. Pt requesting these prescriptions please. She is scheduled for a VV with Dr. Dneae Delatorre next week.     [address] [facility]

## 2021-03-01 NOTE — TELEPHONE ENCOUNTER
I reviewed all prior med orders in [facility] and could not find the antihistamine and decongestant she previously was prescribed. I sent order for doxy and prednisone like she received in November.    If she wants something else please ask her to specify what it was   Thanks

## 2021-03-02 RX ORDER — LISINOPRIL 10 MG/1
TABLET ORAL
Qty: 30 TABLET | Refills: 3 | Status: SHIPPED | OUTPATIENT
Start: 2021-03-02 | End: 2021-05-03

## 2021-03-02 NOTE — TELEPHONE ENCOUNTER
Future Appointments   Date Time Provider Mark Sutherland   3/10/2021  3:00 PM Bud Lomas MD [facility] 12/18/2020

## 2021-03-04 RX ORDER — AZITHROMYCIN 250 MG/1
250 TABLET, FILM COATED ORAL SEE ADMIN INSTRUCTIONS
Qty: 6 TABLET | Refills: 0 | Status: SHIPPED | OUTPATIENT
Start: 2021-03-04 | End: 2021-03-09

## 2021-03-10 ENCOUNTER — VIRTUAL VISIT (OUTPATIENT)
Dept: PULMONOLOGY | Age: 71
End: 2021-03-10
Payer: MEDICARE

## 2021-03-10 DIAGNOSIS — R05.8 COUGH DUE TO ACE INHIBITOR: ICD-10-CM

## 2021-03-10 DIAGNOSIS — J30.9 CHRONIC ALLERGIC RHINITIS: ICD-10-CM

## 2021-03-10 DIAGNOSIS — F17.200 CURRENT SMOKER: ICD-10-CM

## 2021-03-10 DIAGNOSIS — T46.4X5A COUGH DUE TO ACE INHIBITOR: ICD-10-CM

## 2021-03-10 DIAGNOSIS — J41.0 SIMPLE CHRONIC BRONCHITIS (HCC): Primary | ICD-10-CM

## 2021-03-10 DIAGNOSIS — J44.1 CHRONIC OBSTRUCTIVE PULMONARY DISEASE WITH ACUTE EXACERBATION (HCC): ICD-10-CM

## 2021-03-10 PROCEDURE — 99442 PR PHYS/QHP TELEPHONE EVALUATION 11-20 MIN: CPT | Performed by: INTERNAL MEDICINE
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
## 2021-03-11 PROBLEM — T46.4X5A COUGH DUE TO ACE INHIBITOR: Status: ACTIVE | Noted: 2021-03-11

## 2021-03-11 PROBLEM — R05.8 COUGH DUE TO ACE INHIBITOR: Status: ACTIVE | Noted: 2021-03-11

## 2021-03-12 ENCOUNTER — TELEPHONE (OUTPATIENT)
Dept: PULMONOLOGY | Age: 71
End: 2021-03-12

## 2021-03-12 NOTE — TELEPHONE ENCOUNTER
MA Communication: The following orders are received by verbal communication from Sherley Winter MD    Orders include:  Orders for PFT/6MW and CT Lung Screen were all . New orders placed. CT and PFT/6MW to be rescheduled       COVID test to be scheduled        VV followup to be scheduled     LM asking patient to return call to office.

## 2021-03-12 NOTE — PROGRESS NOTES
MA Communication: The following orders are received by verbal communication from Isabela Cary MD    Orders include:  Orders for PFT/6MW and CT Lung Screen were all . New orders placed.        CT and PFT/6MW to nrscheduled       VV followup to be scheduled

## 2021-03-12 NOTE — PATIENT INSTRUCTIONS
Remember to bring a list of pulmonary medications and any CPAP or BiPAP machines to your next appointment with the office. Please keep all of your future appointments scheduled by [address]. Out of respect for other patients and providers, you may be asked to reschedule your appointment if you arrive later than your scheduled appointment time. Appointments cancelled less than 24hrs in advance will be considered a no show. Patients with three missed appointments within 1 year or four missed appointments within 2 years can be dismissed from the practice. Please be aware that our physicians are required to work in the Intensive Care Unit at [facility].  Your appointment may need to be rescheduled if they are designated to work during your appointment time. You may receive a survey regarding the care you received during your visit. Your input is valuable to us. We encourage you to complete and return your survey. We hope you will choose us in the future for your healthcare needs. Pt instructed of all future appointment dates & times, including radiology, labs, procedures & referrals. If procedures were scheduled preparation instructions provided. Instructions on future appointments with [facility] were given.

## 2021-03-22 NOTE — TELEPHONE ENCOUNTER
Pt has not returned numerous calls. At this point we will wait for patient to call back to reschedule testing and OV. Patient was informed of the reason for this intervention.

## 2021-05-12 ENCOUNTER — OFFICE VISIT (OUTPATIENT)
Dept: PRIMARY CARE CLINIC | Age: 71
End: 2021-05-12
Payer: MEDICARE

## 2021-05-12 DIAGNOSIS — Z01.818 PREOP TESTING: Primary | ICD-10-CM

## 2021-05-12 PROCEDURE — G8428 CUR MEDS NOT DOCUMENT: HCPCS | Performed by: NURSE PRACTITIONER

## 2021-05-12 PROCEDURE — 99211 OFF/OP EST MAY X REQ PHY/QHP: CPT | Performed by: NURSE PRACTITIONER

## 2021-05-12 PROCEDURE — G8421 BMI NOT CALCULATED: HCPCS | Performed by: NURSE PRACTITIONER

## 2021-05-12 NOTE — PATIENT INSTRUCTIONS

## 2021-05-12 NOTE — PROGRESS NOTES
Damon Gómez received a viral test for COVID-19. They were educated on isolation and quarantine as appropriate. For any symptoms, they were directed to seek care from their PCP, given contact information to establish with a doctor, directed to an urgent care or the emergency room.

## 2021-05-13 LAB — SARS-COV-2: NOT DETECTED

## 2021-05-20 ENCOUNTER — TELEPHONE (OUTPATIENT)
Dept: CASE MANAGEMENT | Age: 71
End: 2021-05-20

## 2021-05-24 ENCOUNTER — TELEPHONE (OUTPATIENT)
Dept: CASE MANAGEMENT | Age: 71
End: 2021-05-24

## 2021-05-24 RX ORDER — LISINOPRIL 10 MG/1
TABLET ORAL
Qty: 30 TABLET | Refills: 0 | Status: SHIPPED | OUTPATIENT
Start: 2021-05-24 | End: 2021-06-08 | Stop reason: SDUPTHER

## 2021-05-25 ENCOUNTER — HOSPITAL ENCOUNTER (OUTPATIENT)
Dept: PULMONOLOGY | Age: 71
Discharge: HOME | End: 2021-05-25
Payer: MEDICARE

## 2021-05-25 ENCOUNTER — HOSPITAL ENCOUNTER (OUTPATIENT)
Dept: CT IMAGING | Age: 71
Discharge: HOME | End: 2021-05-25
Payer: MEDICARE

## 2021-05-25 DIAGNOSIS — J41.0 SIMPLE CHRONIC BRONCHITIS (HCC): ICD-10-CM

## 2021-05-25 DIAGNOSIS — F17.200 CURRENT SMOKER: ICD-10-CM

## 2021-05-25 LAB
DLCO %PRED: 25 %
DLCO PRED: NORMAL
DLCO/VA %PRED: NORMAL
DLCO/VA PRED: NORMAL
DLCO/VA: NORMAL
DLCO: NORMAL
EXPIRATORY TIME-POST: NORMAL
EXPIRATORY TIME: NORMAL
FEF 25-75% %CHNG: NORMAL
FEF 25-75% %PRED-POST: NORMAL
FEF 25-75% %PRED-PRE: NORMAL
FEF 25-75% PRED: NORMAL
FEF 25-75%-POST: NORMAL
FEF 25-75%-PRE: NORMAL
FEV1 %PRED-POST: 53 %
FEV1 %PRED-PRE: 54 %
FEV1 PRED: NORMAL
FEV1-POST: NORMAL
FEV1-PRE: NORMAL
FEV1/FVC %PRED-POST: NORMAL
FEV1/FVC %PRED-PRE: NORMAL
FEV1/FVC PRED: NORMAL
FEV1/FVC-POST: 48 %
FEV1/FVC-PRE: 48 %
FVC %PRED-POST: NORMAL
FVC %PRED-PRE: NORMAL
FVC PRED: NORMAL
FVC-POST: NORMAL
FVC-PRE: NORMAL
GAW %PRED: NORMAL
GAW PRED: NORMAL
GAW: NORMAL
IC %PRED: NORMAL
IC PRED: NORMAL
IC: NORMAL
MEP: NORMAL
MIP: NORMAL
MVV %PRED-PRE: NORMAL
MVV PRED: NORMAL
MVV-PRE: NORMAL
PEF %PRED-POST: NORMAL
PEF %PRED-PRE: NORMAL
PEF PRED: NORMAL
PEF%CHNG: NORMAL
PEF-POST: NORMAL
PEF-PRE: NORMAL
RAW %PRED: NORMAL
RAW PRED: NORMAL
RAW: NORMAL
RV %PRED: NORMAL
RV PRED: NORMAL
RV: NORMAL
SVC %PRED: NORMAL
SVC PRED: NORMAL
SVC: NORMAL
TLC %PRED: 117 %
TLC PRED: NORMAL
TLC: NORMAL
VA %PRED: NORMAL
VA PRED: NORMAL
VA: NORMAL
VTG %PRED: NORMAL
VTG PRED: NORMAL
VTG: NORMAL

## 2021-05-25 PROCEDURE — 94726 PLETHYSMOGRAPHY LUNG VOLUMES: CPT

## 2021-05-25 PROCEDURE — 94060 EVALUATION OF WHEEZING: CPT

## 2021-05-25 PROCEDURE — 94618 PULMONARY STRESS TESTING: CPT

## 2021-05-25 PROCEDURE — 71271 CT THORAX LUNG CANCER SCR C-: CPT

## 2021-05-25 PROCEDURE — 94729 DIFFUSING CAPACITY: CPT

## 2021-05-25 PROCEDURE — 6370000000 HC RX 637 (ALT 250 FOR IP): Performed by: INTERNAL MEDICINE

## 2021-05-25 RX ORDER — ALBUTEROL SULFATE 90 UG/1
4 AEROSOL, METERED RESPIRATORY (INHALATION) ONCE
Status: COMPLETED | OUTPATIENT
Start: 2021-05-25 | End: 2021-05-25

## 2021-05-25 RX ADMIN — Medication 4 PUFF: at 12:58

## 2021-05-25 ASSESSMENT — PULMONARY FUNCTION TESTS
FEV1/FVC_PRE: 48
FEV1/FVC_POST: 48
FEV1_PERCENT_PREDICTED_PRE: 54
FEV1_PERCENT_PREDICTED_POST: 53

## 2021-05-25 NOTE — PROCEDURES
[address] [place] [address] Pulmonary Function Lab - Six Minute Walk      Test Performed on:   Room Air__X__   Oxygen at _____ lpm via N/C- continuous Oxygen at _____ lpm via N/C- OCD  Assist Device Used During Test:  None_X___ Cane________ Walker_________    Modified Papo's Scale  0 Nothing at all 5 Strong    0.5 Extremely Weak 6 Stronger (Hard)    1 Very weak 7 Very Strong   2 Weak (light) 8 Very Very Strong   3 Moderate 9 Extremely Strong   4 Somewhat Strong 10 Maximum All out      Time SpO2 Heart Rate Respiratory Rate Dyspnea-  Modified Papos Scale Fatigue- Modified Papos Scale Other Symptoms   Baseline                     95% 102 18 0 0      1 minute                     94% 118 18 2 0    2 minutes                     95% 123 20 2 0      3 minutes                     94% 127 24 3 1    4 minutes                     95% 125 24 3 1    5 minutes                     95% 130 28 4 1    6 minutes                     95% 131 32 4 2    Recovery x 1 minute                     97% 124 24 2 0    Recovery x 2 Minute                     97% 117 24 2 0     Number of Laps__10____ X 100 feet + ____50___ additional feet = Total Distance ___1050__feet   Stopped or paused before 6 minutes? No__X___ Yes ________  Total expected 6 MW distance is __1649___feet. Patient achieved __64__% of expected distance.    Pre Blood Pressure:109/69  Post Blood Pressure:127/81  Other symptoms at the end of exercise:

## 2021-05-26 NOTE — PROCEDURES
[facility]                               PULMONARY FUNCTION    PATIENT NAME: Med Mann                     :        1950  MED REC NO:   [id number]                          ROOM:  ACCOUNT NO:   [de-identified]                           ADMIT DATE: 2021  PROVIDER:     Dg Rae MD    DATE OF PROCEDURE:  2021    PFT INTERPRETATION    The patient is a 44-year-old female who underwent a PFT for simple  chronic bronchitis. Spirometry shows FVC to be 84%, FEV1 to be 54%,  FEV1 to FVC ratio was 63%, RUV83-75% was 28%. The patient did not have  any postbronchodilator improvement. Lung volume shows the total lung  capacity was normal.  The patient has air trapping and hyperinflation. The patient also has severe decrease in diffusion capacity when adjusted  for volume at 37%. The patient's flow-volume loop was suggestive of  obstructive pattern. SIX MINUTE WALK TEST    The patient also underwent a 6-minute walk test, which shows baseline  oxygen saturation of 95%, heart rate of 102, respiratory rate of 18,  dyspnea-modified Papo scale of 0, fatigue-modified Papo scale of 0. The  patient did not have any exertional hypoxemia on the study. The total  distance walked was 1050 feet. The patient had total 6-minute walk  distance expected of 1649 feet. The patient achieved 64% of the  expected distance. On the basis of this PFT, the patient has moderate obstructive airway  disease with significant decrease in diffusion capacity along with no  exertional hypoxemia on suboptimal exercise. Please correlate  clinically. The patient may also benefit from pulmonary rehab.         Maritza Penn MD    D: 2021 15:39:32       T: 2021 15:44:40     [email or web address]  Job#: [id number]     Doc#: [id number]    CC:

## 2021-05-27 ENCOUNTER — OFFICE VISIT (OUTPATIENT)
Dept: PULMONOLOGY | Age: 71
End: 2021-05-27
Payer: MEDICARE

## 2021-05-27 VITALS
RESPIRATION RATE: 24 BRPM | OXYGEN SATURATION: 95 % | TEMPERATURE: 98.1 F | HEART RATE: 93 BPM | BODY MASS INDEX: 18.82 KG/M2 | SYSTOLIC BLOOD PRESSURE: 123 MMHG | DIASTOLIC BLOOD PRESSURE: 76 MMHG | WEIGHT: 110.2 LBS | HEIGHT: 64 IN

## 2021-05-27 DIAGNOSIS — J43.2 CENTRILOBULAR EMPHYSEMA (HCC): ICD-10-CM

## 2021-05-27 DIAGNOSIS — F41.9 ANXIETY: ICD-10-CM

## 2021-05-27 DIAGNOSIS — H10.13 ALLERGIC CONJUNCTIVITIS OF BOTH EYES: ICD-10-CM

## 2021-05-27 DIAGNOSIS — J41.0 SIMPLE CHRONIC BRONCHITIS (HCC): Primary | ICD-10-CM

## 2021-05-27 DIAGNOSIS — J30.9 CHRONIC ALLERGIC RHINITIS: ICD-10-CM

## 2021-05-27 DIAGNOSIS — F17.200 CURRENT SMOKER: ICD-10-CM

## 2021-05-27 PROCEDURE — 1123F ACP DISCUSS/DSCN MKR DOCD: CPT | Performed by: INTERNAL MEDICINE

## 2021-05-27 PROCEDURE — G8420 CALC BMI NORM PARAMETERS: HCPCS | Performed by: INTERNAL MEDICINE

## 2021-05-27 PROCEDURE — 4040F PNEUMOC VAC/ADMIN/RCVD: CPT | Performed by: INTERNAL MEDICINE

## 2021-05-27 PROCEDURE — 99214 OFFICE O/P EST MOD 30 MIN: CPT | Performed by: INTERNAL MEDICINE

## 2021-05-27 PROCEDURE — G8400 PT W/DXA NO RESULTS DOC: HCPCS | Performed by: INTERNAL MEDICINE

## 2021-05-27 PROCEDURE — 3023F SPIROM DOC REV: CPT | Performed by: INTERNAL MEDICINE

## 2021-05-27 PROCEDURE — 4004F PT TOBACCO SCREEN RCVD TLK: CPT | Performed by: INTERNAL MEDICINE

## 2021-05-27 PROCEDURE — 1090F PRES/ABSN URINE INCON ASSESS: CPT | Performed by: INTERNAL MEDICINE

## 2021-05-27 PROCEDURE — G8926 SPIRO NO PERF OR DOC: HCPCS | Performed by: INTERNAL MEDICINE

## 2021-05-27 PROCEDURE — 3017F COLORECTAL CA SCREEN DOC REV: CPT | Performed by: INTERNAL MEDICINE

## 2021-05-27 PROCEDURE — G8427 DOCREV CUR MEDS BY ELIG CLIN: HCPCS | Performed by: INTERNAL MEDICINE

## 2021-05-27 RX ORDER — HYDROCODONE BITARTRATE AND ACETAMINOPHEN 7.5; 325 MG/1; MG/1
1 TABLET ORAL EVERY 6 HOURS PRN
COMMUNITY
End: 2022-09-09 | Stop reason: ALTCHOICE

## 2021-05-27 RX ORDER — OLOPATADINE HYDROCHLORIDE 1 MG/ML
1 SOLUTION/ DROPS OPHTHALMIC 2 TIMES DAILY
Qty: 1 BOTTLE | Refills: 2 | Status: SHIPPED | OUTPATIENT
Start: 2021-05-27 | End: 2021-06-26
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
## 2021-05-27 NOTE — PATIENT INSTRUCTIONS
Remember to bring a list of pulmonary medications and any CPAP or BiPAP machines to your next appointment with the office. Please keep all of your future appointments scheduled by [facility] office. Out of respect for other patients and providers, you may be asked to reschedule your appointment if you arrive later than your scheduled appointment time. Appointments cancelled less than 24hrs in advance will be considered a no show. Patients with three missed appointments within 1 year or four missed appointments within 2 years can be dismissed from the practice. Please be aware that our physicians are required to work in the Intensive Care Unit at [facility].  Your appointment may need to be rescheduled if they are designated to work during your appointment time. You may receive a survey regarding the care you received during your visit. Your input is valuable to us. We encourage you to complete and return your survey. We hope you will choose us in the future for your healthcare needs. Pt instructed of all future appointment dates & times, including radiology, labs, procedures & referrals. If procedures were scheduled preparation instructions provided. Instructions on future appointments with [facility] were given.

## 2021-05-27 NOTE — PROGRESS NOTES
MA Communication:   The following orders are received by verbal communication from Joyce Amin MD    Orders include:  3 mo fu scheduled 8/26/21

## 2021-06-08 NOTE — TELEPHONE ENCOUNTER
Future Appointments   Date Time Provider Mark Sutherland   8/26/2021  2:00 PM Stevie Restrepo MD [facility] 12/18/2020

## 2021-06-09 RX ORDER — LISINOPRIL 10 MG/1
TABLET ORAL
Qty: 30 TABLET | Refills: 2 | Status: SHIPPED | OUTPATIENT
Start: 2021-06-09 | End: 2021-07-24 | Stop reason: SDUPTHER

## 2021-07-26 NOTE — TELEPHONE ENCOUNTER
Future Appointments   Date Time Provider Mark Sutherland   8/26/2021  2:00 PM Becky Melchor MD [facility] 12/18/20

## 2021-07-27 RX ORDER — LISINOPRIL 10 MG/1
TABLET ORAL
Qty: 30 TABLET | Refills: 2 | Status: SHIPPED | OUTPATIENT
Start: 2021-07-27 | End: 2021-10-12

## 2021-07-27 RX ORDER — LISINOPRIL 10 MG/1
TABLET ORAL
Qty: 30 TABLET | Refills: 2 | Status: SHIPPED | OUTPATIENT
Start: 2021-07-27 | End: 2021-09-01 | Stop reason: SDUPTHER

## 2021-09-29 ENCOUNTER — TELEPHONE (OUTPATIENT)
Dept: PULMONOLOGY | Age: 71
End: 2021-09-29

## 2021-10-06 RX ORDER — LISINOPRIL 10 MG/1
TABLET ORAL
Qty: 60 TABLET | Refills: 2 | OUTPATIENT
Start: 2021-10-06

## 2021-10-06 NOTE — TELEPHONE ENCOUNTER
Future Appointments   Date Time Provider Mark Sutherland   10/7/2021 11:40 AM BALDOMERO Castro - CNP [facility] [place] [facility]   11/17/2021 10:20 AM Natasha Alcantara MD AND [facility]     LOV 12/2020

## 2021-10-06 NOTE — TELEPHONE ENCOUNTER
----- Message from Maggi Allen sent at 10/6/2021  3:45 PM EDT -----  Subject: Refill Request    QUESTIONS  Name of Medication? lisinopril (PRINIVIL;ZESTRIL) 10 MG tablet  Patient-reported dosage and instructions? 2 a day  How many days do you have left? 15  Preferred Pharmacy? [id number]  Pharmacy phone number (if available)? [phone]  ---------------------------------------------------------------------------  --------------  Natasha ROTH  What is the best way for the office to contact you? OK to leave message on   voicemail  Preferred Call Back Phone Number?  [phone]

## 2021-10-07 ENCOUNTER — TELEMEDICINE (OUTPATIENT)
Dept: FAMILY MEDICINE CLINIC | Age: 71
End: 2021-10-07
Payer: MEDICARE

## 2021-10-07 DIAGNOSIS — F41.0 PANIC ATTACKS: ICD-10-CM

## 2021-10-07 DIAGNOSIS — F43.21 GRIEF REACTION: Primary | ICD-10-CM

## 2021-10-07 PROCEDURE — 99213 OFFICE O/P EST LOW 20 MIN: CPT | Performed by: NURSE PRACTITIONER

## 2021-10-07 PROCEDURE — G8428 CUR MEDS NOT DOCUMENT: HCPCS | Performed by: NURSE PRACTITIONER

## 2021-10-07 PROCEDURE — G8484 FLU IMMUNIZE NO ADMIN: HCPCS | Performed by: NURSE PRACTITIONER

## 2021-10-07 PROCEDURE — 1123F ACP DISCUSS/DSCN MKR DOCD: CPT | Performed by: NURSE PRACTITIONER

## 2021-10-07 PROCEDURE — G8420 CALC BMI NORM PARAMETERS: HCPCS | Performed by: NURSE PRACTITIONER

## 2021-10-07 PROCEDURE — 1090F PRES/ABSN URINE INCON ASSESS: CPT | Performed by: NURSE PRACTITIONER

## 2021-10-07 PROCEDURE — 3017F COLORECTAL CA SCREEN DOC REV: CPT | Performed by: NURSE PRACTITIONER

## 2021-10-07 PROCEDURE — 4004F PT TOBACCO SCREEN RCVD TLK: CPT | Performed by: NURSE PRACTITIONER

## 2021-10-07 PROCEDURE — G8400 PT W/DXA NO RESULTS DOC: HCPCS | Performed by: NURSE PRACTITIONER

## 2021-10-07 PROCEDURE — 4040F PNEUMOC VAC/ADMIN/RCVD: CPT | Performed by: NURSE PRACTITIONER

## 2021-10-07 RX ORDER — HYDROXYZINE PAMOATE 25 MG/1
25 CAPSULE ORAL 3 TIMES DAILY PRN
Qty: 30 CAPSULE | Refills: 0 | Status: SHIPPED | OUTPATIENT
Start: 2021-10-07 | End: 2021-10-17

## 2021-10-07 ASSESSMENT — ENCOUNTER SYMPTOMS
CHEST TIGHTNESS: 0
GASTROINTESTINAL NEGATIVE: 1
WHEEZING: 0
SHORTNESS OF BREATH: 0

## 2021-10-07 NOTE — PROGRESS NOTES
10/7/2021    TELEHEALTH EVALUATION -- Audio/Visual (During PRA-40 public health emergency)    HPI:    Amanda Blancas (:  1950) has requested an audio/video evaluation for the following concern(s):    Chief Complaint   Patient presents with   Ran Soto is seen today to discuss grief and panic attacks. Her youngest sister  unexpectedly last . They had plans to go shopping and when she did not answer her phone they had to call a wellness check. She has had a significant difficulty with the loss of her sister. She and her mother have been trying to plan the services. She is not sleeping well. She states that she has episodes of panic attacks such as feeling of anxiety, rapid heart rate and overall feelings of uneasiness. These do resolve in time but they are very distressing to her. She has been trying deep breathing exercises which have not been very helpful. She is asking for something to help with her anxiety to get her through the visitation tonight and the  tomorrow. Review of Systems   Constitutional: Negative for activity change and fever. Respiratory: Negative for chest tightness, shortness of breath and wheezing. Cardiovascular: Negative for chest pain, palpitations and leg swelling. Taking her blood pressure medication, but not checking her blood pressure. No exertional chest pain, CASH, or diaphoresis. Gastrointestinal: Negative. Psychiatric/Behavioral: Positive for decreased concentration, dysphoric mood and sleep disturbance. Negative for behavioral problems, self-injury and suicidal ideas. The patient is nervous/anxious. Prior to Visit Medications    Medication Sig Taking?  Authorizing Provider   lisinopril (PRINIVIL;ZESTRIL) 10 MG tablet TAKE 1 TABLET BY MOUTH TWICE DAILY  Bo Blind, APRN - CNP   lisinopril (PRINIVIL;ZESTRIL) 10 MG tablet TAKE 1 TABLET BY MOUTH TWICE DAILY  Bo Blind, APRN - CNP HYDROcodone-acetaminophen (NORCO) 7.5-325 MG per tablet Take 1 tablet by mouth every 6 hours as needed for Pain. Historical Provider, MD   Calcium Carbonate Antacid (TUMS PO) Take by mouth  Historical Provider, MD   omeprazole (PRILOSEC) 20 MG delayed release capsule TAKE 1 CAPSULE BY MOUTH TWICE DAILY BEFORE MEALS  Patient not taking: Reported on 12/18/2020  Meghan Khan MD   fluticasone (FLONASE) 50 MCG/ACT nasal spray 1 spray by Each Nostril route daily  Carmen Obrien MD   fluticasone-umeclidin-vilant (TRELEGY ELLIPTA) 100-62.5-25 MCG/INH AEPB Inhale 1 puff into the lungs daily  Carmen Obrien MD   albuterol sulfate  (90 Base) MCG/ACT inhaler Inhale 2 puffs into the lungs every 6 hours as needed for Wheezing  Historical Provider, MD   aspirin 81 MG tablet Take 81 mg by mouth daily  Historical Provider, MD       Social History     Tobacco Use    Smoking status: Current Every Day Smoker     Packs/day: 0.25     Years: 40.00     Pack years: 10.00     Types: Cigarettes     Start date: 5/27/1973    Smokeless tobacco: Never Used    Tobacco comment: 2-3 cigs a day   Vaping Use    Vaping Use: Never used   Substance Use Topics    Alcohol use: No    Drug use: No        Allergies   Allergen Reactions    Amoxicillin     Ampicillin     Doxycycline        PHYSICAL EXAMINATION:  Patient-Reported Vitals 12/18/2020   Patient-Reported Weight 107lb   Patient-Reported Height 5f3         Physical Exam  Constitutional:       Appearance: Normal appearance. HENT:      Head: Normocephalic and atraumatic. Right Ear: External ear normal.      Left Ear: External ear normal.      Nose: Nose normal. No rhinorrhea. Mouth/Throat:      Pharynx: Oropharynx is clear. Eyes:      General:         Right eye: No discharge. Left eye: No discharge. Conjunctiva/sclera: Conjunctivae normal.   Neck:      Trachea: Phonation normal.   Pulmonary:      Effort: Pulmonary effort is normal. No respiratory distress. Musculoskeletal:      Cervical back: Normal range of motion. Skin:     Coloration: Skin is not jaundiced or pale. Neurological:      General: No focal deficit present. Mental Status: She is alert and oriented to person, place, and time. Psychiatric:         Attention and Perception: Attention and perception normal.         Mood and Affect: Mood is anxious. Affect is tearful (especially when talking about her sister). Behavior: Behavior normal.         Thought Content: Thought content normal.         Judgment: Judgment normal.           ASSESSMENT/PLAN:    ICD-10-CM    1. Grief reaction  F43.21    2. Panic attacks  F41.0        Vistaril for anxiety  Reinforced self care and deep breathing  Follow up with provided soon for blood pressure    Orders Placed This Encounter   Medications    hydrOXYzine (VISTARIL) 25 MG capsule     Sig: Take 1 capsule by mouth 3 times daily as needed for Anxiety     Dispense:  30 capsule     Refill:  0     No orders of the defined types were placed in this encounter. Return in about 1 week (around 10/14/2021) for Mental health, HTN chronic care. Yani Rojas is a 73 y.o. female being evaluated by a Virtual Visit (video visit) encounter to address concerns as mentioned above. A caregiver was present when appropriate. Due to this being a TeleHealth encounter (During [place] [address] public health emergency), evaluation of the following organ systems was limited: Vitals/Constitutional/EENT/Resp/CV/GI//MS/Neuro/Skin/Heme-Lymph-Imm. Pursuant to the emergency declaration under the [address] [place] [address], [address] [place] [address] authority and the [employer] Act, this Virtual Visit was conducted with patient's (and/or legal guardian's) consent, to reduce the patient's risk of exposure to COVID-19 and provide necessary medical care.   The patient (and/or legal guardian) has also been advised to contact this office for worsening conditions or problems, and seek emergency medical treatment and/or call 911 if deemed necessary. Patient identification was verified at the start of the visit: Yes    Total time spent on this encounter: Not billed by time    Services were provided through a video synchronous discussion virtually to substitute for in-person clinic visit. Patient and provider were located at their individual homes. --BALDOMERO Grant - CNP on 10/7/2021 at 7:52 AM    An electronic signature was used to authenticate this note.

## 2021-10-08 RX ORDER — LISINOPRIL 10 MG/1
TABLET ORAL
Qty: 14 TABLET | Refills: 0 | Status: SHIPPED | OUTPATIENT
Start: 2021-10-08 | End: 2021-10-12

## 2021-10-11 ENCOUNTER — TELEPHONE (OUTPATIENT)
Dept: PULMONOLOGY | Age: 71
End: 2021-10-11

## 2021-10-11 DIAGNOSIS — J44.1 CHRONIC OBSTRUCTIVE PULMONARY DISEASE WITH ACUTE EXACERBATION (HCC): Primary | ICD-10-CM

## 2021-10-11 RX ORDER — AZITHROMYCIN 250 MG/1
250 TABLET, FILM COATED ORAL SEE ADMIN INSTRUCTIONS
Qty: 6 TABLET | Refills: 0 | Status: SHIPPED | OUTPATIENT
Start: 2021-10-11 | End: 2021-10-12

## 2021-10-11 RX ORDER — METHYLPREDNISOLONE 4 MG/1
TABLET ORAL
Qty: 1 KIT | Refills: 0 | Status: SHIPPED | OUTPATIENT
Start: 2021-10-11 | End: 2021-10-17

## 2021-10-11 NOTE — TELEPHONE ENCOUNTER
Patient called the office stating that for about 2 weeks she has been able to produce yellow phlegm when she coughs. She has had no fever. When asked what symptoms she had she would only reply \"its the same as when I have bronchitis\". Advised patient that she was a no show for her last appt and she might not be able to receive treatment over the phone due to this. Patient has an appt scheduled for 11/17/2021. Please advise.

## 2021-10-12 ENCOUNTER — VIRTUAL VISIT (OUTPATIENT)
Dept: FAMILY MEDICINE CLINIC | Age: 71
End: 2021-10-12
Payer: MEDICARE

## 2021-10-12 DIAGNOSIS — I10 ESSENTIAL HYPERTENSION: ICD-10-CM

## 2021-10-12 DIAGNOSIS — J30.9 CHRONIC ALLERGIC RHINITIS: ICD-10-CM

## 2021-10-12 DIAGNOSIS — F43.21 GRIEF REACTION: Primary | ICD-10-CM

## 2021-10-12 PROCEDURE — 1090F PRES/ABSN URINE INCON ASSESS: CPT | Performed by: NURSE PRACTITIONER

## 2021-10-12 PROCEDURE — G8427 DOCREV CUR MEDS BY ELIG CLIN: HCPCS | Performed by: NURSE PRACTITIONER

## 2021-10-12 PROCEDURE — 1123F ACP DISCUSS/DSCN MKR DOCD: CPT | Performed by: NURSE PRACTITIONER

## 2021-10-12 PROCEDURE — G8400 PT W/DXA NO RESULTS DOC: HCPCS | Performed by: NURSE PRACTITIONER

## 2021-10-12 PROCEDURE — 4040F PNEUMOC VAC/ADMIN/RCVD: CPT | Performed by: NURSE PRACTITIONER

## 2021-10-12 PROCEDURE — 99213 OFFICE O/P EST LOW 20 MIN: CPT | Performed by: NURSE PRACTITIONER

## 2021-10-12 PROCEDURE — 3017F COLORECTAL CA SCREEN DOC REV: CPT | Performed by: NURSE PRACTITIONER

## 2021-10-12 RX ORDER — FLUTICASONE PROPIONATE 50 MCG
2 SPRAY, SUSPENSION (ML) NASAL DAILY
Qty: 1 EACH | Refills: 3 | Status: SHIPPED | OUTPATIENT
Start: 2021-10-12 | End: 2022-01-31 | Stop reason: SDUPTHER

## 2021-10-12 RX ORDER — LISINOPRIL 10 MG/1
10 TABLET ORAL EVERY 12 HOURS
Qty: 60 TABLET | Refills: 3 | Status: SHIPPED | OUTPATIENT
Start: 2021-10-12 | End: 2022-01-09

## 2021-10-12 SDOH — ECONOMIC STABILITY: INCOME INSECURITY: IN THE LAST 12 MONTHS, WAS THERE A TIME WHEN YOU WERE NOT ABLE TO PAY THE MORTGAGE OR RENT ON TIME?: NO

## 2021-10-12 SDOH — ECONOMIC STABILITY: TRANSPORTATION INSECURITY
IN THE PAST 12 MONTHS, HAS THE LACK OF TRANSPORTATION KEPT YOU FROM MEDICAL APPOINTMENTS OR FROM GETTING MEDICATIONS?: NO

## 2021-10-12 SDOH — ECONOMIC STABILITY: FOOD INSECURITY: WITHIN THE PAST 12 MONTHS, THE FOOD YOU BOUGHT JUST DIDN'T LAST AND YOU DIDN'T HAVE MONEY TO GET MORE.: NEVER TRUE

## 2021-10-12 SDOH — ECONOMIC STABILITY: FOOD INSECURITY: WITHIN THE PAST 12 MONTHS, YOU WORRIED THAT YOUR FOOD WOULD RUN OUT BEFORE YOU GOT MONEY TO BUY MORE.: NEVER TRUE

## 2021-10-12 SDOH — ECONOMIC STABILITY: TRANSPORTATION INSECURITY
IN THE PAST 12 MONTHS, HAS LACK OF TRANSPORTATION KEPT YOU FROM MEETINGS, WORK, OR FROM GETTING THINGS NEEDED FOR DAILY LIVING?: NO

## 2021-10-12 SDOH — ECONOMIC STABILITY: HOUSING INSECURITY
IN THE LAST 12 MONTHS, WAS THERE A TIME WHEN YOU DID NOT HAVE A STEADY PLACE TO SLEEP OR SLEPT IN A SHELTER (INCLUDING NOW)?: NO

## 2021-10-12 ASSESSMENT — SOCIAL DETERMINANTS OF HEALTH (SDOH): HOW HARD IS IT FOR YOU TO PAY FOR THE VERY BASICS LIKE FOOD, HOUSING, MEDICAL CARE, AND HEATING?: NOT HARD AT ALL

## 2021-10-12 ASSESSMENT — PATIENT HEALTH QUESTIONNAIRE - PHQ9
SUM OF ALL RESPONSES TO PHQ QUESTIONS 1-9: 0
SUM OF ALL RESPONSES TO PHQ9 QUESTIONS 1 & 2: 0
2. FEELING DOWN, DEPRESSED OR HOPELESS: 0
SUM OF ALL RESPONSES TO PHQ QUESTIONS 1-9: 0
SUM OF ALL RESPONSES TO PHQ QUESTIONS 1-9: 0
1. LITTLE INTEREST OR PLEASURE IN DOING THINGS: 0

## 2021-10-12 ASSESSMENT — ENCOUNTER SYMPTOMS
WHEEZING: 1
COUGH: 1

## 2021-10-12 NOTE — PROGRESS NOTES
Subjective:      Chief Complaint   Patient presents with    Follow-up     Medication check        Patient ID: Shannan Torres is a 73 y.o. female who presents for medication recheck. States she has not getting the right dose of lisinopril. She takes 10 mg 1 tablet twice daily and the pharmacy has only been giving her 1 tablet a day. I checked with the pharmacy and the order is written correctly. I have reviewed wrote the order just to prevent any misjudgment. I also gave her an order for Flonase. States she will get all of her other inhalers from her pulmonary doctor Dr. Liv Hernandez. States she has been difficult to get in this past year secondary to Covid states \"people like me are very afraid of it\". She then talks about her younger sister recently dying. She is very saddened by it it was a very difficult and quick death. Feels somewhat to blame because she did not get her sister medical care sooner. Offered to get her someone to speak to like a counselor, she declined. She was in the office for grief reaction saw Buzz Bailey CNP. Vistaril was prescribed. Patient states she really did not think it did much for her and asked if I could give her something stronger. The only thing I could offer her is an SSRI and counseling and she did not want that. She stated \"I will just pray\"    PMH: COPD, asthma, hypertension, anxiety, depression, grief reaction    HPI above    Family History   Problem Relation Age of Onset    High Blood Pressure Mother     Asthma Mother     Cancer Father         renal    Other Sister         fibromyalgia    Other Maternal Grandfather         black lung       Social History     Socioeconomic History    Marital status:       Spouse name: Not on file    Number of children: Not on file    Years of education: Not on file    Highest education level: Not on file   Occupational History    Not on file   Tobacco Use    Smoking status: Current Every Day Smoker Packs/day: 0.25     Years: 40.00     Pack years: 10.00     Types: Cigarettes     Start date: 5/27/1973    Smokeless tobacco: Never Used    Tobacco comment: 2-3 cigs a day   Vaping Use    Vaping Use: Never used   Substance and Sexual Activity    Alcohol use: No    Drug use: No    Sexual activity: Never   Other Topics Concern    Not on file   Social History Narrative    Not on file     Social Determinants of Health     Financial Resource Strain: Low Risk     Difficulty of Paying Living Expenses: Not hard at all   Food Insecurity: No Food Insecurity    Worried About Running Out of Food in the Last Year: Never true    Heaven of Food in the Last Year: Never true   Transportation Needs: No Transportation Needs    Lack of Transportation (Medical): No    Lack of Transportation (Non-Medical): No   Physical Activity:     Days of Exercise per Week:     Minutes of Exercise per Session:    Stress:     Feeling of Stress :    Social Connections:     Frequency of Communication with Friends and Family:     Frequency of Social Gatherings with Friends and Family:     Attends Denominational Services:     Active Member of Clubs or Organizations:     Attends Club or Organization Meetings:     Marital Status:    Intimate Partner Violence:     Fear of Current or Ex-Partner:     Emotionally Abused:     Physically Abused:     Sexually Abused:        Current Outpatient Medications on File Prior to Visit   Medication Sig Dispense Refill    methylPREDNISolone (MEDROL, JANNY,) 4 MG tablet Take by mouth. 1 kit 0    HYDROcodone-acetaminophen (NORCO) 7.5-325 MG per tablet Take 1 tablet by mouth every 6 hours as needed for Pain.       fluticasone-umeclidin-vilant (TRELEGY ELLIPTA) 100-62.5-25 MCG/INH AEPB Inhale 1 puff into the lungs daily 2 each 0    albuterol sulfate  (90 Base) MCG/ACT inhaler Inhale 2 puffs into the lungs every 6 hours as needed for Wheezing      aspirin 81 MG tablet Take 81 mg by mouth daily      hydrOXYzine (VISTARIL) 25 MG capsule Take 1 capsule by mouth 3 times daily as needed for Anxiety (Patient not taking: Reported on 10/12/2021) 30 capsule 0     No current facility-administered medications on file prior to visit. Review of Systems   Respiratory: Positive for cough and wheezing. COPDtrilogy, albuterol    Smoker   Cardiovascular: Negative for chest pain and palpitations. Hypertension managed with lisinopril   Psychiatric/Behavioral: Negative for self-injury and suicidal ideas. The patient is nervous/anxious. Depressed secondary to sister's death. Objective:     Physical Exam  HENT:      Head: Normocephalic and atraumatic. Pulmonary:      Effort: Pulmonary effort is normal.   Neurological:      Mental Status: She is alert and oriented to person, place, and time. Psychiatric:      Comments: Grief reactionreviewed sister's death in detail. States she feels responsible for not taking care of her seeing warning signs that she was sicker than she knew. Patient's affect is flat speech is slow voice is very quiet. She was prescribed Vistaril as needed stated it did not help that much so she did not like it. Suggested she try it again. Offered her CBT counseling. Stated she would let me know. Denies any suicidal ideation         Assessment:     1. Essential hypertension  Clarified lisinopril ordered to read 10 mg p.o. twice daily    2. Grief reaction  Encouraged to try Vistaril again. Offered SSRI and/or a counselor to speak to. She refused both stated she would continue to pray    3.  Chronic allergic rhinitis  Flonase      Plan:     As above encouraged her to make appointment for physical exam sometime in the spring or summer

## 2021-10-12 NOTE — PATIENT INSTRUCTIONS
Hypertensionwe wrote patient's prescription for lisinopril 10 mg 1 pill twice daily dispense 60 tablets    AllergiesFlonase    Grief reactionencouraged her to try the Vistaril again. Offered CBT and she said may be later on.

## 2021-11-12 ENCOUNTER — TELEPHONE (OUTPATIENT)
Dept: FAMILY MEDICINE CLINIC | Age: 71
End: 2021-11-12

## 2021-11-15 ENCOUNTER — TELEPHONE (OUTPATIENT)
Dept: FAMILY MEDICINE CLINIC | Age: 71
End: 2021-11-15

## 2021-11-15 NOTE — TELEPHONE ENCOUNTER
She was in to see garrick for grief reaction and than me and refused counseling or any medication I suggested. The vistaril Alexx Lowry gave her pt states it did not work. Needs counseling and an SSRI-no I will not prescribe anything we have already tied to help her and she declined our help  If she agrees to a counselor and some Lexapro then certainly.   She should call me

## 2021-11-17 ENCOUNTER — OFFICE VISIT (OUTPATIENT)
Dept: PULMONOLOGY | Age: 71
End: 2021-11-17
Payer: MEDICARE

## 2021-11-17 VITALS
TEMPERATURE: 97.9 F | RESPIRATION RATE: 18 BRPM | OXYGEN SATURATION: 98 % | DIASTOLIC BLOOD PRESSURE: 55 MMHG | HEIGHT: 64 IN | WEIGHT: 109.3 LBS | HEART RATE: 90 BPM | SYSTOLIC BLOOD PRESSURE: 131 MMHG | BODY MASS INDEX: 18.66 KG/M2

## 2021-11-17 DIAGNOSIS — K21.9 GASTROESOPHAGEAL REFLUX DISEASE WITHOUT ESOPHAGITIS: ICD-10-CM

## 2021-11-17 DIAGNOSIS — J41.0 SIMPLE CHRONIC BRONCHITIS (HCC): Primary | ICD-10-CM

## 2021-11-17 DIAGNOSIS — F41.9 ANXIETY: ICD-10-CM

## 2021-11-17 DIAGNOSIS — J30.9 CHRONIC ALLERGIC RHINITIS: ICD-10-CM

## 2021-11-17 DIAGNOSIS — J43.2 CENTRILOBULAR EMPHYSEMA (HCC): ICD-10-CM

## 2021-11-17 DIAGNOSIS — F17.200 CURRENT SMOKER: ICD-10-CM

## 2021-11-17 PROCEDURE — G8427 DOCREV CUR MEDS BY ELIG CLIN: HCPCS | Performed by: INTERNAL MEDICINE

## 2021-11-17 PROCEDURE — 3023F SPIROM DOC REV: CPT | Performed by: INTERNAL MEDICINE

## 2021-11-17 PROCEDURE — 1090F PRES/ABSN URINE INCON ASSESS: CPT | Performed by: INTERNAL MEDICINE

## 2021-11-17 PROCEDURE — 3017F COLORECTAL CA SCREEN DOC REV: CPT | Performed by: INTERNAL MEDICINE

## 2021-11-17 PROCEDURE — G0008 ADMIN INFLUENZA VIRUS VAC: HCPCS | Performed by: INTERNAL MEDICINE

## 2021-11-17 PROCEDURE — 4040F PNEUMOC VAC/ADMIN/RCVD: CPT | Performed by: INTERNAL MEDICINE

## 2021-11-17 PROCEDURE — 99213 OFFICE O/P EST LOW 20 MIN: CPT | Performed by: INTERNAL MEDICINE

## 2021-11-17 PROCEDURE — G8484 FLU IMMUNIZE NO ADMIN: HCPCS | Performed by: INTERNAL MEDICINE

## 2021-11-17 PROCEDURE — 90694 VACC AIIV4 NO PRSRV 0.5ML IM: CPT | Performed by: INTERNAL MEDICINE

## 2021-11-17 PROCEDURE — G8420 CALC BMI NORM PARAMETERS: HCPCS | Performed by: INTERNAL MEDICINE

## 2021-11-17 PROCEDURE — 4004F PT TOBACCO SCREEN RCVD TLK: CPT | Performed by: INTERNAL MEDICINE

## 2021-11-17 PROCEDURE — 1123F ACP DISCUSS/DSCN MKR DOCD: CPT | Performed by: INTERNAL MEDICINE

## 2021-11-17 PROCEDURE — G8400 PT W/DXA NO RESULTS DOC: HCPCS | Performed by: INTERNAL MEDICINE

## 2021-11-17 RX ORDER — ALBUTEROL SULFATE 90 UG/1
2 AEROSOL, METERED RESPIRATORY (INHALATION) EVERY 6 HOURS PRN
Qty: 1 EACH | Refills: 5 | Status: SHIPPED | OUTPATIENT
Start: 2021-11-17 | End: 2022-01-31 | Stop reason: SDUPTHER

## 2021-11-17 NOTE — PROGRESS NOTES
MA Communication:   The following orders are received by verbal communication from Hawk Pyle MD    Orders include:  4 month follow up: 03/17/2022 1:00 pm  Flu vaccine given 11/17/2021

## 2021-11-17 NOTE — PATIENT INSTRUCTIONS
Remember to bring a list of pulmonary medications and any CPAP or BiPAP machines to your next appointment with the office. Please keep all of your future appointments scheduled by [address], [facility] office. Out of respect for other patients and providers, you may be asked to reschedule your appointment if you arrive later than your scheduled appointment time. Appointments cancelled less than 24hrs in advance will be considered a no show. Patients with three missed appointments within 1 year or four missed appointments within 2 years can be dismissed from the practice. Please be aware that our physicians are required to work in the Intensive Care Unit at [facility].  Your appointment may need to be rescheduled if they are designated to work during your appointment time. You may receive a survey regarding the care you received during your visit. Your input is valuable to us. We encourage you to complete and return your survey. We hope you will choose us in the future for your healthcare needs. Pt instructed of all future appointment dates & times, including radiology, labs, procedures & referrals. If procedures were scheduled preparation instructions provided. Instructions on future appointments with [facility] were given.
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
## 2021-12-07 ENCOUNTER — OFFICE VISIT (OUTPATIENT)
Dept: FAMILY MEDICINE CLINIC | Age: 71
End: 2021-12-07
Payer: MEDICARE

## 2021-12-07 VITALS
HEIGHT: 64 IN | OXYGEN SATURATION: 97 % | SYSTOLIC BLOOD PRESSURE: 132 MMHG | TEMPERATURE: 97.3 F | DIASTOLIC BLOOD PRESSURE: 74 MMHG | WEIGHT: 111 LBS | HEART RATE: 97 BPM | BODY MASS INDEX: 18.95 KG/M2

## 2021-12-07 DIAGNOSIS — F41.9 ANXIETY: Primary | ICD-10-CM

## 2021-12-07 DIAGNOSIS — F43.21 GRIEF REACTION: ICD-10-CM

## 2021-12-07 PROCEDURE — G8484 FLU IMMUNIZE NO ADMIN: HCPCS | Performed by: NURSE PRACTITIONER

## 2021-12-07 PROCEDURE — G8400 PT W/DXA NO RESULTS DOC: HCPCS | Performed by: NURSE PRACTITIONER

## 2021-12-07 PROCEDURE — 99213 OFFICE O/P EST LOW 20 MIN: CPT | Performed by: NURSE PRACTITIONER

## 2021-12-07 PROCEDURE — 4004F PT TOBACCO SCREEN RCVD TLK: CPT | Performed by: NURSE PRACTITIONER

## 2021-12-07 PROCEDURE — G8427 DOCREV CUR MEDS BY ELIG CLIN: HCPCS | Performed by: NURSE PRACTITIONER

## 2021-12-07 PROCEDURE — G8420 CALC BMI NORM PARAMETERS: HCPCS | Performed by: NURSE PRACTITIONER

## 2021-12-07 PROCEDURE — 1123F ACP DISCUSS/DSCN MKR DOCD: CPT | Performed by: NURSE PRACTITIONER

## 2021-12-07 PROCEDURE — 4040F PNEUMOC VAC/ADMIN/RCVD: CPT | Performed by: NURSE PRACTITIONER

## 2021-12-07 PROCEDURE — 1090F PRES/ABSN URINE INCON ASSESS: CPT | Performed by: NURSE PRACTITIONER

## 2021-12-07 PROCEDURE — 3017F COLORECTAL CA SCREEN DOC REV: CPT | Performed by: NURSE PRACTITIONER

## 2021-12-07 RX ORDER — PAROXETINE 10 MG/1
10 TABLET, FILM COATED ORAL DAILY
Qty: 30 TABLET | Refills: 3 | Status: SHIPPED | OUTPATIENT
Start: 2021-12-07 | End: 2022-05-06 | Stop reason: SINTOL

## 2021-12-07 RX ORDER — OMEPRAZOLE 40 MG/1
40 CAPSULE, DELAYED RELEASE ORAL
Qty: 30 CAPSULE | Refills: 2 | Status: SHIPPED | OUTPATIENT
Start: 2021-12-07 | End: 2022-01-31 | Stop reason: SDUPTHER

## 2021-12-07 NOTE — PROGRESS NOTES
Subjective:      Chief Complaint   Patient presents with    Blood Work     requesting     Anxiety     her sister passed away in October        Patient ID: German Hartman is a 73 y.o. female who presents for something for anxiety. She presents with a very somber appearance. Looking down at the ground a lot if she is talking. States she just does not think she or her mother can handle losing her sister. Asked me what it means when there is an \"investigation\" into her sister's death. Could this mean she took her own life? Could this mean she was into drugs? Patient was asking a lot of questions about her sister and the family. I tried to get her to focus on her primary problem for being here then she finally look straight at me and said I would like to get back on Xanax since that is the only thing that really help me focus. She has seen a counselor last year and in his note he stated that she refused an SSRI only wanted Xanax. Assume that is why she stopped seeing him. Tells me she is no longer on the Norco?  Her [place]AR[place] was reviewed. PMH: Anxiety/asthma/COPD/emphysema and hypertension. Patient is a smoker and has already received 3 doses of the Pfizer vaccine    HPI as above    Family History   Problem Relation Age of Onset    High Blood Pressure Mother     Asthma Mother     Cancer Father         renal    Other Sister         fibromyalgia    Other Maternal Grandfather         black lung       Social History     Socioeconomic History    Marital status:       Spouse name: Not on file    Number of children: Not on file    Years of education: Not on file    Highest education level: Not on file   Occupational History    Not on file   Tobacco Use    Smoking status: Current Every Day Smoker     Packs/day: 0.25     Years: 40.00     Pack years: 10.00     Types: Cigarettes     Start date: 5/27/1973    Smokeless tobacco: Never Used    Tobacco comment: 2-3 cigs a day   Vaping Use    Vaping Use: Never used   Substance and Sexual Activity    Alcohol use: No    Drug use: No    Sexual activity: Never   Other Topics Concern    Not on file   Social History Narrative    Not on file     Social Determinants of Health     Financial Resource Strain: Low Risk     Difficulty of Paying Living Expenses: Not hard at all   Food Insecurity: No Food Insecurity    Worried About Running Out of Food in the Last Year: Never true    Heaven of Food in the Last Year: Never true   Transportation Needs: No Transportation Needs    Lack of Transportation (Medical): No    Lack of Transportation (Non-Medical):  No   Physical Activity:     Days of Exercise per Week: Not on file    Minutes of Exercise per Session: Not on file   Stress:     Feeling of Stress : Not on file   Social Connections:     Frequency of Communication with Friends and Family: Not on file    Frequency of Social Gatherings with Friends and Family: Not on file    Attends Muslim Services: Not on file    Active Member of [address] [place] [address] or Organizations: Not on file    Attends Club or Organization Meetings: Not on file    Marital Status: Not on file   Intimate Partner Violence:     Fear of Current or Ex-Partner: Not on file    Emotionally Abused: Not on file    Physically Abused: Not on file    Sexually Abused: Not on file   Housing Stability: Unknown    Unable to Pay for Housing in the Last Year: No    Number of [place] in the Last Year: Not on file    Unstable Housing in the Last Year: No       Current Outpatient Medications on File Prior to Visit   Medication Sig Dispense Refill    fluticasone-umeclidin-vilant (TRELEGY ELLIPTA) 100-62.5-25 MCG/INH AEPB Inhale 1 puff into the lungs daily 1 each 5    albuterol sulfate HFA (PROAIR HFA) 108 (90 Base) MCG/ACT inhaler Inhale 2 puffs into the lungs every 6 hours as needed for Wheezing 1 each 5    lisinopril (PRINIVIL;ZESTRIL) 10 MG tablet Take 1 tablet by mouth every 12 hours 60 tablet 3    fluticasone (FLONASE) 50 MCG/ACT nasal spray 2 sprays by Nasal route daily 1 each 3    HYDROcodone-acetaminophen (NORCO) 7.5-325 MG per tablet Take 1 tablet by mouth every 6 hours as needed for Pain.  fluticasone-umeclidin-vilant (TRELEGY ELLIPTA) 100-62.5-25 MCG/INH AEPB Inhale 1 puff into the lungs daily 2 each 0     No current facility-administered medications on file prior to visit. Review of Systems   Respiratory: Positive for shortness of breath. Negative for cough, chest tightness and wheezing. COPD/emphysema-pulmonary following, albuterol and Trelegy   Cardiovascular: Negative. Negative for chest pain, palpitations and leg swelling. Hyperlipidemia levels from 2020 were total 223-HDL 61--triglycerides 186, diet and exercise  Hypertension today 132/74 controlled with lisinopril   Gastrointestinal:        GERD-PPI   Psychiatric/Behavioral: Positive for decreased concentration and sleep disturbance. The patient is nervous/anxious. Anxiety-just lost her sister very sad, flat affect-finally agreed to try an SSRI start her on Paxil       Objective:     Physical Exam  Vitals reviewed. Constitutional:       Appearance: Normal appearance. HENT:      Head: Normocephalic and atraumatic. Eyes:      Extraocular Movements: Extraocular movements intact. Conjunctiva/sclera: Conjunctivae normal.   Cardiovascular:      Rate and Rhythm: Normal rate and regular rhythm. Pulses: Normal pulses. Heart sounds: Normal heart sounds. No murmur heard. No friction rub. No gallop. Pulmonary:      Effort: Pulmonary effort is normal.      Breath sounds: Wheezing present. No rhonchi or rales. Chest:      Chest wall: No tenderness. Skin:     General: Skin is warm and dry. Neurological:      General: No focal deficit present. Mental Status: She is alert and oriented to person, place, and time. Cranial Nerves: No cranial nerve deficit. Motor: No weakness. Coordination: Coordination normal.      Gait: Gait normal.      Deep Tendon Reflexes: Reflexes normal.   Psychiatric:         Mood and Affect: Mood normal.         Behavior: Behavior normal.         Thought Content: Thought content normal.         Judgment: Judgment normal.      Comments: Affect, does not provide eye contact, looks down or away from provider. States she is \"extremely sad\" after her sister . She does not think it will work but she has agreed to try an SSRI         Assessment:     1. Anxiety  Begin Paxil, reviewed signs and symptoms of medication and when to call for help. Patient is aware that it may take 2 weeks before she feels anything.     2. Grief reaction  We will try an SSRI, suggest she try CBT therapy however at this point she does not wish to speak to a counselor        Plan:   As above  Once her anxiety is somewhat managed she needs to return for lab work and a physical exam.  She has not been in since

## 2021-12-12 ASSESSMENT — ENCOUNTER SYMPTOMS
COUGH: 0
CHEST TIGHTNESS: 0
WHEEZING: 0
SHORTNESS OF BREATH: 1

## 2022-01-09 RX ORDER — LISINOPRIL 10 MG/1
10 TABLET ORAL EVERY 12 HOURS
Qty: 180 TABLET | Refills: 1 | Status: SHIPPED | OUTPATIENT
Start: 2022-01-09 | End: 2022-01-31 | Stop reason: SDUPTHER

## 2022-01-31 ENCOUNTER — TELEPHONE (OUTPATIENT)
Dept: FAMILY MEDICINE CLINIC | Age: 72
End: 2022-01-31

## 2022-01-31 DIAGNOSIS — J41.0 SIMPLE CHRONIC BRONCHITIS (HCC): ICD-10-CM

## 2022-01-31 DIAGNOSIS — J43.2 CENTRILOBULAR EMPHYSEMA (HCC): ICD-10-CM

## 2022-01-31 RX ORDER — OMEPRAZOLE 40 MG/1
40 CAPSULE, DELAYED RELEASE ORAL
Qty: 30 CAPSULE | Refills: 2 | Status: SHIPPED | OUTPATIENT
Start: 2022-01-31 | End: 2022-05-06 | Stop reason: SDUPTHER

## 2022-01-31 RX ORDER — LISINOPRIL 10 MG/1
10 TABLET ORAL EVERY 12 HOURS
Qty: 180 TABLET | Refills: 1 | Status: SHIPPED | OUTPATIENT
Start: 2022-01-31 | End: 2022-05-06 | Stop reason: SDUPTHER

## 2022-01-31 RX ORDER — ALBUTEROL SULFATE 90 UG/1
2 AEROSOL, METERED RESPIRATORY (INHALATION) EVERY 6 HOURS PRN
Qty: 1 EACH | Refills: 5 | Status: SHIPPED | OUTPATIENT
Start: 2022-01-31 | End: 2022-05-06 | Stop reason: SDUPTHER

## 2022-01-31 RX ORDER — FLUTICASONE PROPIONATE 50 MCG
2 SPRAY, SUSPENSION (ML) NASAL DAILY
Qty: 1 EACH | Refills: 3 | Status: SHIPPED | OUTPATIENT
Start: 2022-01-31 | End: 2022-09-09 | Stop reason: ALTCHOICE

## 2022-01-31 NOTE — TELEPHONE ENCOUNTER
----- Message from [facility] sent at 1/31/2022  2:51 PM EST -----  Subject: Message to Provider    QUESTIONS  Information for Provider? Pt needs a form to be filled out by Dr. Tonya Vaz for approval for surgery on eye. Please call   ---------------------------------------------------------------------------  --------------  CALL BACK INFO  What is the best way for the office to contact you? OK to leave message on   voicemail  Preferred Call Back Phone Number? [phone]  ---------------------------------------------------------------------------  --------------  SCRIPT ANSWERS  Relationship to Patient?  Self

## 2022-01-31 NOTE — TELEPHONE ENCOUNTER
Future Appointments   Date Time Provider Mark Sutherland   3/17/2022  1:00 PM Juan Multani MD [facility] 12/7/2021

## 2022-02-11 ENCOUNTER — TELEPHONE (OUTPATIENT)
Dept: FAMILY MEDICINE CLINIC | Age: 72
End: 2022-02-11

## 2022-03-17 ENCOUNTER — OFFICE VISIT (OUTPATIENT)
Dept: PULMONOLOGY | Age: 72
End: 2022-03-17
Payer: MEDICARE

## 2022-03-17 VITALS
BODY MASS INDEX: 18.82 KG/M2 | RESPIRATION RATE: 16 BRPM | HEIGHT: 64 IN | SYSTOLIC BLOOD PRESSURE: 130 MMHG | DIASTOLIC BLOOD PRESSURE: 74 MMHG | TEMPERATURE: 97.7 F | HEART RATE: 86 BPM | WEIGHT: 110.2 LBS | OXYGEN SATURATION: 98 %

## 2022-03-17 DIAGNOSIS — J41.0 SIMPLE CHRONIC BRONCHITIS (HCC): ICD-10-CM

## 2022-03-17 DIAGNOSIS — G47.30 OBSERVED SLEEP APNEA: ICD-10-CM

## 2022-03-17 DIAGNOSIS — F41.9 ANXIETY: ICD-10-CM

## 2022-03-17 DIAGNOSIS — F17.200 CURRENT SMOKER: ICD-10-CM

## 2022-03-17 DIAGNOSIS — J30.9 CHRONIC ALLERGIC RHINITIS: ICD-10-CM

## 2022-03-17 DIAGNOSIS — K21.9 GASTROESOPHAGEAL REFLUX DISEASE WITHOUT ESOPHAGITIS: ICD-10-CM

## 2022-03-17 DIAGNOSIS — J43.2 CENTRILOBULAR EMPHYSEMA (HCC): Primary | ICD-10-CM

## 2022-03-17 PROCEDURE — 99214 OFFICE O/P EST MOD 30 MIN: CPT | Performed by: INTERNAL MEDICINE

## 2022-03-17 PROCEDURE — G8420 CALC BMI NORM PARAMETERS: HCPCS | Performed by: INTERNAL MEDICINE

## 2022-03-17 PROCEDURE — 3017F COLORECTAL CA SCREEN DOC REV: CPT | Performed by: INTERNAL MEDICINE

## 2022-03-17 PROCEDURE — 1123F ACP DISCUSS/DSCN MKR DOCD: CPT | Performed by: INTERNAL MEDICINE

## 2022-03-17 PROCEDURE — G8400 PT W/DXA NO RESULTS DOC: HCPCS | Performed by: INTERNAL MEDICINE

## 2022-03-17 PROCEDURE — 4040F PNEUMOC VAC/ADMIN/RCVD: CPT | Performed by: INTERNAL MEDICINE

## 2022-03-17 PROCEDURE — 4004F PT TOBACCO SCREEN RCVD TLK: CPT | Performed by: INTERNAL MEDICINE

## 2022-03-17 PROCEDURE — G8427 DOCREV CUR MEDS BY ELIG CLIN: HCPCS | Performed by: INTERNAL MEDICINE

## 2022-03-17 PROCEDURE — 3023F SPIROM DOC REV: CPT | Performed by: INTERNAL MEDICINE

## 2022-03-17 PROCEDURE — 1090F PRES/ABSN URINE INCON ASSESS: CPT | Performed by: INTERNAL MEDICINE

## 2022-03-17 PROCEDURE — G8484 FLU IMMUNIZE NO ADMIN: HCPCS | Performed by: INTERNAL MEDICINE
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
## 2022-03-17 NOTE — PROGRESS NOTES
MA Communication:   The following orders are received by verbal communication from Nimesh Buchanan MD    Orders include:    HST: 1 month  Follow up after sleep study

## 2022-03-17 NOTE — PATIENT INSTRUCTIONS
Remember to bring a list of pulmonary medications and any CPAP or BiPAP machines to your next appointment with the office. Please keep all of your future appointments scheduled by [facility] office. Out of respect for other patients and providers, you may be asked to reschedule your appointment if you arrive later than your scheduled appointment time. Appointments cancelled less than 24hrs in advance will be considered a no show. Patients with three missed appointments within 1 year or four missed appointments within 2 years can be dismissed from the practice. Please be aware that our physicians are required to work in the Intensive Care Unit at [facility].  Your appointment may need to be rescheduled if they are designated to work during your appointment time. You may receive a survey regarding the care you received during your visit. Your input is valuable to us. We encourage you to complete and return your survey. We hope you will choose us in the future for your healthcare needs. Pt instructed of all future appointment dates & times, including radiology, labs, procedures & referrals. If procedures were scheduled preparation instructions provided. Instructions on future appointments with [facility] were given. The [facility] will call to schedule your sleep study. If you have any questions please call them at [phone]. Please call our office to schedule your next appointment after your sleep study is scheduled.

## 2022-05-06 ENCOUNTER — TELEMEDICINE (OUTPATIENT)
Dept: FAMILY MEDICINE CLINIC | Age: 72
End: 2022-05-06

## 2022-05-06 ENCOUNTER — TELEPHONE (OUTPATIENT)
Dept: PULMONOLOGY | Age: 72
End: 2022-05-06

## 2022-05-06 DIAGNOSIS — J43.2 CENTRILOBULAR EMPHYSEMA (HCC): ICD-10-CM

## 2022-05-06 DIAGNOSIS — I11.9 HYPERTENSIVE HEART DISEASE WITHOUT HEART FAILURE: ICD-10-CM

## 2022-05-06 DIAGNOSIS — J44.1 COPD EXACERBATION (HCC): Primary | ICD-10-CM

## 2022-05-06 DIAGNOSIS — K21.00 GASTROESOPHAGEAL REFLUX DISEASE WITH ESOPHAGITIS WITHOUT HEMORRHAGE: Primary | ICD-10-CM

## 2022-05-06 DIAGNOSIS — J41.0 SIMPLE CHRONIC BRONCHITIS (HCC): ICD-10-CM

## 2022-05-06 PROCEDURE — 99999 PR OFFICE/OUTPT VISIT,PROCEDURE ONLY: CPT | Performed by: NURSE PRACTITIONER

## 2022-05-06 RX ORDER — AZITHROMYCIN 250 MG/1
250 TABLET, FILM COATED ORAL SEE ADMIN INSTRUCTIONS
Qty: 6 TABLET | Refills: 0 | Status: SHIPPED | OUTPATIENT
Start: 2022-05-06 | End: 2022-05-11

## 2022-05-06 RX ORDER — FLUTICASONE FUROATE, UMECLIDINIUM BROMIDE AND VILANTEROL TRIFENATATE 100; 62.5; 25 UG/1; UG/1; UG/1
1 POWDER RESPIRATORY (INHALATION) DAILY
Qty: 2 EACH | Refills: 0 | Status: SHIPPED | OUTPATIENT
Start: 2022-05-06 | End: 2022-07-13

## 2022-05-06 RX ORDER — METHYLPREDNISOLONE 4 MG/1
TABLET ORAL
Qty: 1 KIT | Refills: 0 | Status: SHIPPED | OUTPATIENT
Start: 2022-05-06 | End: 2022-05-12

## 2022-05-06 RX ORDER — OMEPRAZOLE 40 MG/1
40 CAPSULE, DELAYED RELEASE ORAL
Qty: 30 CAPSULE | Refills: 2 | Status: SHIPPED | OUTPATIENT
Start: 2022-05-06 | End: 2022-09-23 | Stop reason: ALTCHOICE

## 2022-05-06 RX ORDER — ALBUTEROL SULFATE 90 UG/1
2 AEROSOL, METERED RESPIRATORY (INHALATION) EVERY 6 HOURS PRN
Qty: 1 EACH | Refills: 5 | Status: SHIPPED | OUTPATIENT
Start: 2022-05-06

## 2022-05-06 RX ORDER — LISINOPRIL 10 MG/1
10 TABLET ORAL EVERY 12 HOURS
Qty: 60 TABLET | Refills: 0 | Status: SHIPPED | OUTPATIENT
Start: 2022-05-06 | End: 2022-05-09 | Stop reason: SDUPTHER

## 2022-05-06 SDOH — HEALTH STABILITY: PHYSICAL HEALTH: ON AVERAGE, HOW MANY MINUTES DO YOU ENGAGE IN EXERCISE AT THIS LEVEL?: 30 MIN

## 2022-05-06 SDOH — HEALTH STABILITY: PHYSICAL HEALTH: ON AVERAGE, HOW MANY DAYS PER WEEK DO YOU ENGAGE IN MODERATE TO STRENUOUS EXERCISE (LIKE A BRISK WALK)?: 6 DAYS

## 2022-05-06 ASSESSMENT — PATIENT HEALTH QUESTIONNAIRE - PHQ9
SUM OF ALL RESPONSES TO PHQ QUESTIONS 1-9: 0
1. LITTLE INTEREST OR PLEASURE IN DOING THINGS: 0
2. FEELING DOWN, DEPRESSED OR HOPELESS: 0
SUM OF ALL RESPONSES TO PHQ9 QUESTIONS 1 & 2: 0
SUM OF ALL RESPONSES TO PHQ QUESTIONS 1-9: 0
SUM OF ALL RESPONSES TO PHQ9 QUESTIONS 1 & 2: 0
SUM OF ALL RESPONSES TO PHQ QUESTIONS 1-9: 0
1. LITTLE INTEREST OR PLEASURE IN DOING THINGS: 0
SUM OF ALL RESPONSES TO PHQ QUESTIONS 1-9: 0
2. FEELING DOWN, DEPRESSED OR HOPELESS: 0

## 2022-05-06 ASSESSMENT — LIFESTYLE VARIABLES
HOW OFTEN DO YOU HAVE A DRINK CONTAINING ALCOHOL: NEVER
HOW OFTEN DO YOU HAVE A DRINK CONTAINING ALCOHOL: NEVER
HOW OFTEN DO YOU HAVE A DRINK CONTAINING ALCOHOL: 1
HOW OFTEN DO YOU HAVE SIX OR MORE DRINKS ON ONE OCCASION: 1

## 2022-05-06 NOTE — TELEPHONE ENCOUNTER
Pt called and said she has bronchitis. Pt has increased SOB, yellow phlegm, no fever, chest feels tight, and wheezing. This had been going on since Wednesday.     Pt said you gave her an antihistamine/decongestant that also helped before    Pharmacy:  [facility]

## 2022-05-06 NOTE — PROGRESS NOTES
Subjective:      Chief Complaint   Patient presents with    Medicare AWV       Patient ID: Tye Chavarria is a 73 y.o. female who presents for medication refill. Post to be seen in the office on Tuesday and was a no-show. She tells me now because she was confused and tired and she missed the appointment thinking it was today. She needs her medications refilled. HPI    Family History   Problem Relation Age of Onset    High Blood Pressure Mother     Asthma Mother     Cancer Father         renal    Other Sister         fibromyalgia    Other Maternal Grandfather         black lung       Social History     Socioeconomic History    Marital status:      Spouse name: Not on file    Number of children: Not on file    Years of education: Not on file    Highest education level: Not on file   Occupational History    Not on file   Tobacco Use    Smoking status: Current Every Day Smoker     Packs/day: 0.25     Years: 40.00     Pack years: 10.00     Types: Cigarettes     Start date: 5/27/1973    Smokeless tobacco: Never Used    Tobacco comment: 2-3 cigs a day   Vaping Use    Vaping Use: Never used   Substance and Sexual Activity    Alcohol use: No    Drug use: No    Sexual activity: Never   Other Topics Concern    Not on file   Social History Narrative    Not on file     Social Determinants of Health     Financial Resource Strain: Low Risk     Difficulty of Paying Living Expenses: Not hard at all   Food Insecurity: No Food Insecurity    Worried About Running Out of Food in the Last Year: Never true    Heaven of Food in the Last Year: Never true   Transportation Needs: No Transportation Needs    Lack of Transportation (Medical): No    Lack of Transportation (Non-Medical):  No   Physical Activity: Sufficiently Active    Days of Exercise per Week: 6 days    Minutes of Exercise per Session: 30 min   Stress:     Feeling of Stress : Not on file   Social Connections:     Frequency of Communication with Friends and Family: Not on file    Frequency of Social Gatherings with Friends and Family: Not on file    Attends Spiritism Services: Not on file    Active Member of Clubs or Organizations: Not on file    Attends Club or Organization Meetings: Not on file    Marital Status: Not on file   Intimate Partner Violence:     Fear of Current or Ex-Partner: Not on file    Emotionally Abused: Not on file    Physically Abused: Not on file    Sexually Abused: Not on file   Housing Stability: Unknown    Unable to Pay for Housing in the Last Year: No    Number of [place] in the Last Year: Not on file    Unstable Housing in the Last Year: No       Current Outpatient Medications on File Prior to Visit   Medication Sig Dispense Refill    fluticasone (FLONASE) 50 MCG/ACT nasal spray 2 sprays by Nasal route daily 1 each 3    HYDROcodone-acetaminophen (NORCO) 7.5-325 MG per tablet Take 1 tablet by mouth every 6 hours as needed for Pain. (Patient not taking: Reported on 5/6/2022)       No current facility-administered medications on file prior to visit.        Review of Systems    Objective:     Physical Exam    Assessment:       Plan:

## 2022-05-08 ENCOUNTER — TELEPHONE (OUTPATIENT)
Dept: CASE MANAGEMENT | Age: 72
End: 2022-05-08

## 2022-05-08 NOTE — TELEPHONE ENCOUNTER
Patient due for annual CT Lung Screening. Reminder letter mailed.     Susan Holman [address] Lung Navigator  [phone]

## 2022-05-09 ENCOUNTER — OFFICE VISIT (OUTPATIENT)
Dept: FAMILY MEDICINE CLINIC | Age: 72
End: 2022-05-09
Payer: MEDICARE

## 2022-05-09 VITALS
HEART RATE: 95 BPM | BODY MASS INDEX: 17.99 KG/M2 | DIASTOLIC BLOOD PRESSURE: 64 MMHG | RESPIRATION RATE: 16 BRPM | OXYGEN SATURATION: 98 % | TEMPERATURE: 97.5 F | SYSTOLIC BLOOD PRESSURE: 122 MMHG | WEIGHT: 104.8 LBS

## 2022-05-09 DIAGNOSIS — E78.2 MIXED HYPERLIPIDEMIA: ICD-10-CM

## 2022-05-09 DIAGNOSIS — I11.9 HYPERTENSIVE HEART DISEASE WITHOUT HEART FAILURE: ICD-10-CM

## 2022-05-09 DIAGNOSIS — Z85.828 HISTORY OF SKIN CANCER: ICD-10-CM

## 2022-05-09 DIAGNOSIS — K21.00 GASTROESOPHAGEAL REFLUX DISEASE WITH ESOPHAGITIS WITHOUT HEMORRHAGE: ICD-10-CM

## 2022-05-09 DIAGNOSIS — E55.9 VITAMIN D DEFICIENCY, UNSPECIFIED: ICD-10-CM

## 2022-05-09 DIAGNOSIS — K21.00 GASTROESOPHAGEAL REFLUX DISEASE WITH ESOPHAGITIS WITHOUT HEMORRHAGE: Primary | ICD-10-CM

## 2022-05-09 PROCEDURE — G8419 CALC BMI OUT NRM PARAM NOF/U: HCPCS | Performed by: NURSE PRACTITIONER

## 2022-05-09 PROCEDURE — 1123F ACP DISCUSS/DSCN MKR DOCD: CPT | Performed by: NURSE PRACTITIONER

## 2022-05-09 PROCEDURE — 4004F PT TOBACCO SCREEN RCVD TLK: CPT | Performed by: NURSE PRACTITIONER

## 2022-05-09 PROCEDURE — 3017F COLORECTAL CA SCREEN DOC REV: CPT | Performed by: NURSE PRACTITIONER

## 2022-05-09 PROCEDURE — 1090F PRES/ABSN URINE INCON ASSESS: CPT | Performed by: NURSE PRACTITIONER

## 2022-05-09 PROCEDURE — 99213 OFFICE O/P EST LOW 20 MIN: CPT | Performed by: NURSE PRACTITIONER

## 2022-05-09 PROCEDURE — G8427 DOCREV CUR MEDS BY ELIG CLIN: HCPCS | Performed by: NURSE PRACTITIONER

## 2022-05-09 PROCEDURE — G8400 PT W/DXA NO RESULTS DOC: HCPCS | Performed by: NURSE PRACTITIONER

## 2022-05-09 RX ORDER — LISINOPRIL 10 MG/1
10 TABLET ORAL EVERY 12 HOURS
Qty: 180 TABLET | Refills: 0 | Status: SHIPPED | OUTPATIENT
Start: 2022-05-09 | End: 2022-09-09 | Stop reason: SDUPTHER

## 2022-05-09 RX ORDER — CETIRIZINE HYDROCHLORIDE 10 MG/1
10 CAPSULE, LIQUID FILLED ORAL DAILY
COMMUNITY
End: 2022-09-23 | Stop reason: ALTCHOICE

## 2022-05-09 RX ORDER — TIZANIDINE 2 MG/1
2 TABLET ORAL PRN
COMMUNITY
End: 2022-09-09 | Stop reason: ALTCHOICE

## 2022-05-09 RX ORDER — LANOLIN ALCOHOL/MO/W.PET/CERES
3 CREAM (GRAM) TOPICAL NIGHTLY PRN
COMMUNITY

## 2022-05-09 ASSESSMENT — PATIENT HEALTH QUESTIONNAIRE - PHQ9
1. LITTLE INTEREST OR PLEASURE IN DOING THINGS: 0
SUM OF ALL RESPONSES TO PHQ QUESTIONS 1-9: 0
2. FEELING DOWN, DEPRESSED OR HOPELESS: 0
SUM OF ALL RESPONSES TO PHQ QUESTIONS 1-9: 0
SUM OF ALL RESPONSES TO PHQ QUESTIONS 1-9: 0
SUM OF ALL RESPONSES TO PHQ9 QUESTIONS 1 & 2: 0
SUM OF ALL RESPONSES TO PHQ QUESTIONS 1-9: 0

## 2022-05-09 NOTE — TELEPHONE ENCOUNTER
LOV 5/6/2022  Future Appointments   Date Time Provider Mark Sutherland   5/9/2022  2:20 PM Patricia Shaka, APRN - CNP [facility] [place] - [facility]     This was sent today as a 30 day supply patient requesting be resent as a 90 day supply per insurance benefit.

## 2022-05-09 NOTE — ACP (ADVANCE CARE PLANNING)
Advance Care Planning     General Advance Care Planning (ACP) Conversation    Date of Conversation: 5/9/2022  Conducted with: {Discussion Participants:28054::\"Patient with Decision Making Capacity\"}    Healthcare Decision Maker:  No healthcare decision makers have been documented. Click here to complete [address] including selection of the Healthcare Decision Maker Relationship (ie \"Primary\")  {Select if Healthcare Decision Makers in ACP Activity was empty/incomplete (Optional):120257880}    Content/Action Overview:  {ACP;  Content/Action Overview:378784910::\"Has ACP document(s) on file - reflects the patient's care preferences\"}  Reviewed DNR/DNI and patient {APC;  DNR/DNI:958469831::\"elects Full Code (Attempt Resuscitation)\"}  {Topics Discussed (Optional):836891753}  {Additional Comments/Outcomes (Optional):012456792}    Length of Voluntary ACP Conversation in minutes:  {TIME; ACP time 16 min - 1 hour:61226::\"<16 minutes (Non-Billable)\"}    [employer]    {Note - If the relationship to the patient does NOT follow our state's Next of Kin hierarchy, the patient MUST complete an ACP Document to allow him/her to act on the patient's behalf:54472}

## 2022-05-10 LAB
A/G RATIO: 1.7 (ref 1.1–2.2)
ALBUMIN SERPL-MCNC: 4.8 G/DL (ref 3.4–5)
ALP BLD-CCNC: 62 U/L (ref 40–129)
ALT SERPL-CCNC: 13 U/L (ref 10–40)
ANION GAP SERPL CALCULATED.3IONS-SCNC: 18 MMOL/L (ref 3–16)
AST SERPL-CCNC: 15 U/L (ref 15–37)
BASOPHILS ABSOLUTE: 0 K/UL (ref 0–0.2)
BASOPHILS RELATIVE PERCENT: 0.3 %
BILIRUB SERPL-MCNC: <0.2 MG/DL (ref 0–1)
BUN BLDV-MCNC: 28 MG/DL (ref 7–20)
CALCIUM SERPL-MCNC: 10.3 MG/DL (ref 8.3–10.6)
CHLORIDE BLD-SCNC: 101 MMOL/L (ref 99–110)
CHOLESTEROL, TOTAL: 310 MG/DL (ref 0–199)
CO2: 21 MMOL/L (ref 21–32)
CREAT SERPL-MCNC: 1.1 MG/DL (ref 0.6–1.2)
EOSINOPHILS ABSOLUTE: 0 K/UL (ref 0–0.6)
EOSINOPHILS RELATIVE PERCENT: 0 %
GFR AFRICAN AMERICAN: 59
GFR NON-AFRICAN AMERICAN: 49
GLUCOSE BLD-MCNC: 122 MG/DL (ref 70–99)
HCT VFR BLD CALC: 38 % (ref 36–48)
HDLC SERPL-MCNC: 64 MG/DL (ref 40–60)
HEMOGLOBIN: 12.8 G/DL (ref 12–16)
LDL CHOLESTEROL CALCULATED: 213 MG/DL
LYMPHOCYTES ABSOLUTE: 1.7 K/UL (ref 1–5.1)
LYMPHOCYTES RELATIVE PERCENT: 18.6 %
MCH RBC QN AUTO: 31.1 PG (ref 26–34)
MCHC RBC AUTO-ENTMCNC: 33.8 G/DL (ref 31–36)
MCV RBC AUTO: 91.9 FL (ref 80–100)
MONOCYTES ABSOLUTE: 1 K/UL (ref 0–1.3)
MONOCYTES RELATIVE PERCENT: 11.2 %
NEUTROPHILS ABSOLUTE: 6.3 K/UL (ref 1.7–7.7)
NEUTROPHILS RELATIVE PERCENT: 69.9 %
PDW BLD-RTO: 13.5 % (ref 12.4–15.4)
PLATELET # BLD: 297 K/UL (ref 135–450)
PMV BLD AUTO: 7.9 FL (ref 5–10.5)
POTASSIUM SERPL-SCNC: 4.3 MMOL/L (ref 3.5–5.1)
RBC # BLD: 4.13 M/UL (ref 4–5.2)
SODIUM BLD-SCNC: 140 MMOL/L (ref 136–145)
TOTAL PROTEIN: 7.6 G/DL (ref 6.4–8.2)
TRIGL SERPL-MCNC: 166 MG/DL (ref 0–150)
TSH REFLEX: 0.89 UIU/ML (ref 0.27–4.2)
VITAMIN D 25-HYDROXY: 23.7 NG/ML
VLDLC SERPL CALC-MCNC: 33 MG/DL
WBC # BLD: 9 K/UL (ref 4–11)

## 2022-05-16 ASSESSMENT — ENCOUNTER SYMPTOMS
ABDOMINAL DISTENTION: 0
WHEEZING: 0
COUGH: 0
ABDOMINAL PAIN: 0
BACK PAIN: 1
SHORTNESS OF BREATH: 0

## 2022-05-16 NOTE — PATIENT INSTRUCTIONS
Stomach issues referred to Dr. Ana French  Skin areas of questionable refer to Derm    Smoking cessation reviewed

## 2022-05-16 NOTE — PROGRESS NOTES
Subjective:      Chief Complaint   Patient presents with    Medication Check       Patient ID: Eunice Andrea is a 73 y.o. female who presents for medication check. When asked about what medication patient was completely unclear. States she did not even know why she was asked to come in and she just does what she is told to do. She is able to pick out a plethora of problems to discuss everything from her anxiety, her stomach problems i.e. GERD and her inability to sleep at night. Reviewed each and every one of her medications all of which she says works yet she still has all of these medical problems. She also began to tell me about her trigger fingers and her skin issues which she believes are moles that are changing. I asked her to show those to me and what I was looking at work skin tags. Smoker  She has received 2 doses of the [place] and boosted    HPI as above    Family History   Problem Relation Age of Onset    High Blood Pressure Mother     Asthma Mother     Cancer Father         renal    Other Sister         fibromyalgia    Other Maternal Grandfather         black lung       Social History     Socioeconomic History    Marital status:       Spouse name: Not on file    Number of children: Not on file    Years of education: Not on file    Highest education level: Not on file   Occupational History    Not on file   Tobacco Use    Smoking status: Current Every Day Smoker     Packs/day: 0.25     Years: 40.00     Pack years: 10.00     Types: Cigarettes     Start date: 5/27/1973    Smokeless tobacco: Never Used    Tobacco comment: 2-3 cigs a day   Vaping Use    Vaping Use: Never used   Substance and Sexual Activity    Alcohol use: No    Drug use: No    Sexual activity: Never   Other Topics Concern    Not on file   Social History Narrative    Not on file     Social Determinants of Health     Financial Resource Strain: Low Risk     Difficulty of Paying Living Expenses: Not hard at all Food Insecurity: No Food Insecurity    Worried About Running Out of Food in the Last Year: Never true    Heaven of Food in the Last Year: Never true   Transportation Needs: No Transportation Needs    Lack of Transportation (Medical): No    Lack of Transportation (Non-Medical):  No   Physical Activity: Sufficiently Active    Days of Exercise per Week: 6 days    Minutes of Exercise per Session: 30 min   Stress:     Feeling of Stress : Not on file   Social Connections:     Frequency of Communication with Friends and Family: Not on file    Frequency of Social Gatherings with Friends and Family: Not on file    Attends Advent Services: Not on file    Active Member of Clubs or Organizations: Not on file    Attends Club or Organization Meetings: Not on file    Marital Status: Not on file   Intimate Partner Violence:     Fear of Current or Ex-Partner: Not on file    Emotionally Abused: Not on file    Physically Abused: Not on file    Sexually Abused: Not on file   Housing Stability: Unknown    Unable to Pay for Housing in the Last Year: No    Number of [place] in the Last Year: Not on file    Unstable Housing in the Last Year: No       Current Outpatient Medications on File Prior to Visit   Medication Sig Dispense Refill    tiZANidine (ZANAFLEX) 2 MG tablet Take 2 mg by mouth as needed      melatonin 3 MG TABS tablet Take 3 mg by mouth nightly as needed      Cetirizine HCl (ZYRTEC ALLERGY) 10 MG CAPS Take 10 mg by mouth daily      albuterol sulfate HFA (PROAIR HFA) 108 (90 Base) MCG/ACT inhaler Inhale 2 puffs into the lungs every 6 hours as needed for Wheezing 1 each 5    fluticasone-umeclidin-vilant (TRELEGY ELLIPTA) 100-62.5-25 MCG/INH AEPB Inhale 1 puff into the lungs daily 2 each 0    omeprazole (PRILOSEC) 40 MG delayed release capsule Take 1 capsule by mouth every morning (before breakfast) 30 capsule 2    fluticasone (FLONASE) 50 MCG/ACT nasal spray 2 sprays by Nasal route daily 1 each 3    HYDROcodone-acetaminophen (NORCO) 7.5-325 MG per tablet Take 1 tablet by mouth every 6 hours as needed for Pain. (Patient not taking: Reported on 5/9/2022)       No current facility-administered medications on file prior to visit. Review of Systems   Respiratory: Negative for cough, shortness of breath and wheezing. COPD-Trelegy, albuterol   Cardiovascular: Negative for chest pain and palpitations. Hypertension-lisinopril   Gastrointestinal: Negative for abdominal distention and abdominal pain. GERD-PPI   Musculoskeletal: Positive for arthralgias, back pain ([place] [facility]), joint swelling and myalgias. Negative for neck pain. Allergic/Immunologic: Positive for environmental allergies (Zyrtec). Psychiatric/Behavioral: Positive for agitation and sleep disturbance (Melatonin). The patient is nervous/anxious (Tizanidine). Objective:     Physical Exam  Vitals reviewed. Constitutional:       Appearance: Normal appearance. HENT:      Head: Normocephalic and atraumatic. Eyes:      Conjunctiva/sclera: Conjunctivae normal.   Neck:      Vascular: No carotid bruit. Cardiovascular:      Pulses: Normal pulses. Heart sounds: Normal heart sounds. No murmur heard. No friction rub. No gallop. Pulmonary:      Effort: Pulmonary effort is normal.      Breath sounds: Normal breath sounds. No wheezing, rhonchi or rales. Chest:      Chest wall: No tenderness. Musculoskeletal:         General: No swelling or tenderness. Normal range of motion. Cervical back: Normal range of motion and neck supple. No rigidity. Right lower leg: No edema. Left lower leg: No edema. Skin:     General: Skin is warm and dry. Neurological:      Mental Status: She is alert and oriented to person, place, and time.       Gait: Gait abnormal.      Deep Tendon Reflexes: Reflexes normal.   Psychiatric:         Mood and Affect: Mood normal.         Behavior: Behavior normal.         Thought Content: Thought content normal.         Judgment: Judgment normal.         Assessment:   1. Gastroesophageal reflux disease with esophagitis without hemorrhage  GERD, abdominal pain  - Comprehensive Metabolic Panel; Future  - CBC with Auto Differential; Future  - LIPID PANEL; Future  - TSH with Reflex; Future  - Vitamin D 25 Hydroxy; Future  - AFL - Zulay Pimentel MD, Gastroenterology, [facility]    2. Hypertensive heart disease without heart failure     - LIPID PANEL; Future    3. Mixed hyperlipidemia     - TSH with Reflex; Future    4. Vitamin D deficiency, unspecified   Folbic supplement ordered  - Vitamin D 25 Hydroxy; Future    5.  History of skin cancer  Questionable skin areas we will refer her to [address] [place], [place] [address]Katia MD, Dermatology, [place]        Plan:   As above  Call with any abnormal labs

## 2022-06-03 ENCOUNTER — TELEPHONE (OUTPATIENT)
Dept: CASE MANAGEMENT | Age: 72
End: 2022-06-03

## 2022-06-03 DIAGNOSIS — F17.200 SMOKER: Primary | ICD-10-CM

## 2022-06-03 NOTE — TELEPHONE ENCOUNTER
Patient due for annual CT Lung Screening. If you would like patient to have screening, please place order for CT Lung Screening ([facility] [id number]). Patient due for annual CT Lung Screening. Reminder letter mailed.     Thank you,  Tomi Morillo RN  [facility] Lung Navigator  [phone]

## 2022-07-11 NOTE — TELEPHONE ENCOUNTER
5/9/2022    Future Appointments   Date Time Provider Mark Sutherland   8/17/2022  3:00 PM [facility]HA CT [facility]CT [facility]   8/17/2022  3:40 PM Kelli Lyon MD AND [facility]

## 2022-07-13 RX ORDER — FLUTICASONE FUROATE, UMECLIDINIUM BROMIDE AND VILANTEROL TRIFENATATE 100; 62.5; 25 UG/1; UG/1; UG/1
1 POWDER RESPIRATORY (INHALATION) DAILY
Qty: 120 EACH | Refills: 1 | Status: SHIPPED | OUTPATIENT
Start: 2022-07-13

## 2022-07-21 ENCOUNTER — TELEPHONE (OUTPATIENT)
Dept: FAMILY MEDICINE CLINIC | Age: 72
End: 2022-07-21

## 2022-07-21 NOTE — TELEPHONE ENCOUNTER
Future Appointments   Date Time Provider Mark Ena   8/1/2022 11:20 AM BALDOMERO Miles - CNP [facility] [place] [facility]   8/17/2022  3:00 PM MHA CT VCT [facility] CT [facility]   8/17/2022  3:40 PM Cherry Javier MD AND [facility]

## 2022-08-01 ENCOUNTER — TELEPHONE (OUTPATIENT)
Dept: FAMILY MEDICINE CLINIC | Age: 72
End: 2022-08-01

## 2022-08-01 NOTE — TELEPHONE ENCOUNTER
Patient has been a no show three times in the past year (02/11, 05/02, 08/01). Do you want to dismiss?

## 2022-08-04 RX ORDER — OMEPRAZOLE 40 MG/1
CAPSULE, DELAYED RELEASE ORAL
Qty: 30 CAPSULE | Refills: 2 | OUTPATIENT
Start: 2022-08-04

## 2022-08-05 RX ORDER — OMEPRAZOLE 40 MG/1
CAPSULE, DELAYED RELEASE ORAL
Qty: 90 CAPSULE | OUTPATIENT
Start: 2022-08-05

## 2022-08-05 NOTE — TELEPHONE ENCOUNTER
----- Message from [address] sent at 8/5/2022  4:53 PM EDT -----  Subject: Refill Request    QUESTIONS  Name of Medication? omeprazole (PRILOSEC) 40 MG delayed release capsule  Patient-reported dosage and instructions? 1t in am   How many days do you have left? 2  Preferred Pharmacy? [facility] #[id number]  Pharmacy phone number (if available)? [phone]  ---------------------------------------------------------------------------  --------------  Roscorine Nip INFO  What is the best way for the office to contact you? OK to leave message on   voicemail  Preferred Call Back Phone Number? [phone]  ---------------------------------------------------------------------------  --------------  SCRIPT ANSWERS  Relationship to Patient?  Self

## 2022-08-05 NOTE — TELEPHONE ENCOUNTER
8/1/2022    Future Appointments   Date Time Provider Mark Sutherland   8/17/2022  3:00 PM MHA CT VCT [facility]HA[facility] CT [employer]   9/8/2022  2:00 PM Elenita Rees MD AND [facility]

## 2022-08-09 RX ORDER — OMEPRAZOLE 40 MG/1
40 CAPSULE, DELAYED RELEASE ORAL
Qty: 30 CAPSULE | Refills: 2 | OUTPATIENT
Start: 2022-08-09

## 2022-08-22 ENCOUNTER — TELEPHONE (OUTPATIENT)
Dept: PULMONOLOGY | Age: 72
End: 2022-08-22

## 2022-08-22 DIAGNOSIS — J44.1 CHRONIC OBSTRUCTIVE PULMONARY DISEASE WITH ACUTE EXACERBATION (HCC): Primary | ICD-10-CM

## 2022-08-22 RX ORDER — AZITHROMYCIN 250 MG/1
250 TABLET, FILM COATED ORAL SEE ADMIN INSTRUCTIONS
Qty: 6 TABLET | Refills: 0 | Status: SHIPPED | OUTPATIENT
Start: 2022-08-22 | End: 2022-08-27

## 2022-08-22 RX ORDER — PREDNISONE 20 MG/1
20 TABLET ORAL DAILY
Qty: 10 TABLET | Refills: 0 | Status: SHIPPED | OUTPATIENT
Start: 2022-08-22 | End: 2022-09-01

## 2022-08-22 NOTE — TELEPHONE ENCOUNTER
Pt called and said she has bronchitis again and is requesting an antibiotic and prednisone be sent to [facility]. Pt has appt on 9/8/22 but doesn't want to wait that long. I offered her appt tomorrow but she is unable to make it.

## 2022-08-23 ENCOUNTER — HOSPITAL ENCOUNTER (OUTPATIENT)
Dept: CT IMAGING | Age: 72
Discharge: HOME | End: 2022-08-23
Payer: MEDICARE

## 2022-08-23 DIAGNOSIS — F17.200 SMOKER: ICD-10-CM

## 2022-08-23 PROCEDURE — 71271 CT THORAX LUNG CANCER SCR C-: CPT

## 2022-08-26 NOTE — TELEPHONE ENCOUNTER
Call patient left VM ,relate Dr Richard Gonzalez respond,and mention we have available appointment today(8/26/22) with our [address] [place] [address] if she wish to be seen.

## 2022-08-31 RX ORDER — OMEPRAZOLE 40 MG/1
CAPSULE, DELAYED RELEASE ORAL
Qty: 30 CAPSULE | Refills: 2 | OUTPATIENT
Start: 2022-08-31

## 2022-09-08 ENCOUNTER — OFFICE VISIT (OUTPATIENT)
Dept: PULMONOLOGY | Age: 72
End: 2022-09-08
Payer: MEDICARE

## 2022-09-08 VITALS
HEART RATE: 86 BPM | HEIGHT: 64 IN | SYSTOLIC BLOOD PRESSURE: 142 MMHG | DIASTOLIC BLOOD PRESSURE: 77 MMHG | RESPIRATION RATE: 16 BRPM | TEMPERATURE: 97.5 F | OXYGEN SATURATION: 98 % | BODY MASS INDEX: 17.21 KG/M2 | WEIGHT: 100.8 LBS

## 2022-09-08 DIAGNOSIS — J41.0 SIMPLE CHRONIC BRONCHITIS (HCC): ICD-10-CM

## 2022-09-08 DIAGNOSIS — J30.9 CHRONIC ALLERGIC RHINITIS: ICD-10-CM

## 2022-09-08 DIAGNOSIS — J43.2 CENTRILOBULAR EMPHYSEMA (HCC): Primary | ICD-10-CM

## 2022-09-08 DIAGNOSIS — F41.9 ANXIETY: ICD-10-CM

## 2022-09-08 DIAGNOSIS — F17.200 CURRENT SMOKER: ICD-10-CM

## 2022-09-08 PROCEDURE — 99214 OFFICE O/P EST MOD 30 MIN: CPT | Performed by: INTERNAL MEDICINE

## 2022-09-08 PROCEDURE — G8400 PT W/DXA NO RESULTS DOC: HCPCS | Performed by: INTERNAL MEDICINE

## 2022-09-08 PROCEDURE — 1123F ACP DISCUSS/DSCN MKR DOCD: CPT | Performed by: INTERNAL MEDICINE

## 2022-09-08 PROCEDURE — 3023F SPIROM DOC REV: CPT | Performed by: INTERNAL MEDICINE

## 2022-09-08 PROCEDURE — 3017F COLORECTAL CA SCREEN DOC REV: CPT | Performed by: INTERNAL MEDICINE

## 2022-09-08 PROCEDURE — G8427 DOCREV CUR MEDS BY ELIG CLIN: HCPCS | Performed by: INTERNAL MEDICINE

## 2022-09-08 PROCEDURE — 1090F PRES/ABSN URINE INCON ASSESS: CPT | Performed by: INTERNAL MEDICINE

## 2022-09-08 PROCEDURE — G8419 CALC BMI OUT NRM PARAM NOF/U: HCPCS | Performed by: INTERNAL MEDICINE

## 2022-09-08 PROCEDURE — 4004F PT TOBACCO SCREEN RCVD TLK: CPT | Performed by: INTERNAL MEDICINE

## 2022-09-08 NOTE — PROGRESS NOTES
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 take albuterol inhaler on whenever necessary basis  Patient was told about diet and lifestyle modifications patient in view of that patient has reflux symptoms  Patient should avoid taking narcotics and sedatives and the effect causing respiratory depression were discussed  Patient was told to maintain social distancing and to wear facemask  Patient was told about diet and lifestyle modifications for GERD  Patient needs to discuss with her PCP about PPI or H2 blocker or any GI evaluation  Patient to follow-up with pulmonary rehab  Patient was offered flu shot but patient wants to defer it  Patient was again described about comprehensive care for herself otherwise she will have increased morbidity mortality and can have a respiratory arrest which was reinforced   Patient to keep herself warm in the cerda and to avoid sick contacts  Patient does not think that she has sleep apnea and does not want to proceed with sleep studies  Patient was told to make sure that she has a flu shot at the end of the month onwards

## 2022-09-08 NOTE — PATIENT INSTRUCTIONS
Remember to bring a list of pulmonary medications and any CPAP or BiPAP machines to your next appointment with the office. Please keep all of your future appointments scheduled by [facility] office. Out of respect for other patients and providers, you may be asked to reschedule your appointment if you arrive later than your scheduled appointment time. Appointments cancelled less than 24hrs in advance will be considered a no show. Patients with three missed appointments within 1 year or four missed appointments within 2 years can be dismissed from the practice. Please be aware that our physicians are required to work in the Intensive Care Unit at [facility].  Your appointment may need to be rescheduled if they are designated to work during your appointment time. You may receive a survey regarding the care you received during your visit. Your input is valuable to us. We encourage you to complete and return your survey. We hope you will choose us in the future for your healthcare needs. Pt instructed of all future appointment dates & times, including radiology, labs, procedures & referrals. If procedures were scheduled preparation instructions provided. Instructions on future appointments with [facility] were given.

## 2022-09-08 NOTE — PROGRESS NOTES
MA Communication:   The following orders are received by verbal communication from Marcelo Reed MD    Orders include:    PFT/6MW: 10/06/2022 2:00 pm  Follow up: 10/06/2022 3:00 pm

## 2022-09-09 ENCOUNTER — OFFICE VISIT (OUTPATIENT)
Dept: PRIMARY CARE CLINIC | Age: 72
End: 2022-09-09
Payer: MEDICARE

## 2022-09-09 VITALS
DIASTOLIC BLOOD PRESSURE: 82 MMHG | HEART RATE: 79 BPM | BODY MASS INDEX: 17.11 KG/M2 | HEIGHT: 64 IN | TEMPERATURE: 98.2 F | WEIGHT: 100.2 LBS | SYSTOLIC BLOOD PRESSURE: 126 MMHG | OXYGEN SATURATION: 98 %

## 2022-09-09 DIAGNOSIS — Z78.0 POSTMENOPAUSAL: ICD-10-CM

## 2022-09-09 DIAGNOSIS — R68.81 EARLY SATIETY: Primary | ICD-10-CM

## 2022-09-09 DIAGNOSIS — Z12.31 ENCOUNTER FOR SCREENING MAMMOGRAM FOR MALIGNANT NEOPLASM OF BREAST: ICD-10-CM

## 2022-09-09 DIAGNOSIS — I10 PRIMARY HYPERTENSION: ICD-10-CM

## 2022-09-09 PROCEDURE — 99203 OFFICE O/P NEW LOW 30 MIN: CPT | Performed by: FAMILY MEDICINE

## 2022-09-09 PROCEDURE — 1123F ACP DISCUSS/DSCN MKR DOCD: CPT | Performed by: FAMILY MEDICINE

## 2022-09-09 RX ORDER — LISINOPRIL 10 MG/1
10 TABLET ORAL DAILY
Qty: 180 TABLET | Refills: 0 | Status: SHIPPED | OUTPATIENT
Start: 2022-09-09 | End: 2022-11-04 | Stop reason: SDUPTHER

## 2022-09-09 ASSESSMENT — ENCOUNTER SYMPTOMS
COLOR CHANGE: 0
SHORTNESS OF BREATH: 1
VOMITING: 0
BLOOD IN STOOL: 0
NAUSEA: 0
ABDOMINAL PAIN: 1
SORE THROAT: 0
DIARRHEA: 0
COUGH: 1

## 2022-09-09 ASSESSMENT — PATIENT HEALTH QUESTIONNAIRE - PHQ9
1. LITTLE INTEREST OR PLEASURE IN DOING THINGS: 0
SUM OF ALL RESPONSES TO PHQ QUESTIONS 1-9: 0
2. FEELING DOWN, DEPRESSED OR HOPELESS: 0
SUM OF ALL RESPONSES TO PHQ9 QUESTIONS 1 & 2: 0
SUM OF ALL RESPONSES TO PHQ QUESTIONS 1-9: 0

## 2022-09-09 NOTE — ASSESSMENT & PLAN NOTE
Well controlled on manual repeat. ACEi refilled, reports tolerating well and reports adherence.      Blood Pressure Goal:  [ ] < 130/80 (ACC/AHA)  [x] < 140/90 (JNC-8 for age < 61, or CKD, or DM)  [ ] < 150/90 (JNC-8 for age > 61)

## 2022-09-09 NOTE — ASSESSMENT & PLAN NOTE
Poorly controlled and given age and no prior colonoscopy, alarm sx and need for GI referral, placed today. Counseled on PPIs and avoiding long-term use. DEXA also ordered.

## 2022-09-09 NOTE — PROGRESS NOTES
Diana Arias is a 81y.o. year old female here for:    Chief Complaint:    Chief Complaint   Patient presents with    Establish Care    Hypertension     Subjective: Today, her current concerns include:    HPI:  #COPD:  - Onset: Years  - Context: Has an appt for PFTs in 1 month with Dr. Raffi Newell (pulmonology). Current daily smoker 4-5 cigarettes per day. Denied recent hospitalizations or intubations. - Quality: Not on oxygen currently  - Timing/Duration: Constant and daily condition  - Progression: Worsening  - Modifiers: Using Trellegy daily but not consistently and albuterol twice daily  - Associated Symptoms: Per ROS as otherwise stated in this note    #Abd pain/Nutrition:  - Onset: Years  - Context: Has never been to see a GI doctor. Early satiety. Has never had a colonoscopy. BM appear to be normal - no blood. - Location: Lower abdomen  - Quality: Burning and bloating  - Radiation: Epigastric and lower abdomen  - Severity: At worst 8/10, today is 6/10  - Timing/Duration: Daily and constant  - Progression: Worsening 10 months ago  - Modifiers: Has been taking omeprazole daily for many years, at least 10 years  - Associated Symptoms: Per ROS as otherwise stated in this note. Also with nausea.     #Screening  - Overdue for mammo and dexa    Past Medical History:   Diagnosis Date    Anxiety     Asthma     COPD (chronic obstructive pulmonary disease) ([facility])     Emphysema of lung ([facility] [address]     Hypertension      Social History     Tobacco Use    Smoking status: Every Day     Packs/day: 0.65     Years: 42.00     Pack years: 27.30     Types: Cigarettes     Start date: 5/27/1973    Smokeless tobacco: Never    Tobacco comments:     09/08/22 3-4 cigs a day, per smoking hx audit, pt smoked 40 years in 2020, at heaviest pt smoked 1 ppd, current 0.25 ppd, average 0.65   Vaping Use    Vaping Use: Never used   Substance Use Topics    Alcohol use: No    Drug use: No     Family History   Problem Relation Age of Onset    High Blood Pressure Mother     Asthma Mother     Cancer Father         renal cell carcinoma    Other Sister         fibromyalgia    Other Maternal Grandfather         black lung    Colon Cancer Neg Hx     Breast Cancer Neg Hx      Past Surgical History:   Procedure Laterality Date    CARPAL TUNNEL RELEASE Bilateral     EYE SURGERY      KNEE SURGERY Left     SINUS SURGERY      SKIN CANCER EXCISION      basal cell removal under left eye         Current Outpatient Medications:     lisinopril (PRINIVIL;ZESTRIL) 10 MG tablet, Take 1 tablet by mouth daily, Disp: 180 tablet, Rfl: 0    TRELEGY ELLIPTA 100-62.5-25 MCG/INH AEPB, INHALE 1 PUFF INTO THE LUNGS DAILY, Disp: 120 each, Rfl: 1    melatonin 3 MG TABS tablet, Take 3 mg by mouth nightly as needed, Disp: , Rfl:     Cetirizine HCl (ZYRTEC ALLERGY) 10 MG CAPS, Take 10 mg by mouth daily (Patient not taking: Reported on 9/8/2022), Disp: , Rfl:     albuterol sulfate HFA (PROAIR HFA) 108 (90 Base) MCG/ACT inhaler, Inhale 2 puffs into the lungs every 6 hours as needed for Wheezing, Disp: 1 each, Rfl: 5    omeprazole (PRILOSEC) 40 MG delayed release capsule, Take 1 capsule by mouth every morning (before breakfast), Disp: 30 capsule, Rfl: 2  Allergies   Allergen Reactions    Amoxicillin Other (See Comments)    Ampicillin Other (See Comments)    Doxycycline     Lidocaine Other (See Comments)     Review of Systems:  Review of Systems   Constitutional:  Negative for chills, diaphoresis and fever. HENT:  Negative for drooling and sore throat. Respiratory:  Positive for cough (chronic - not worsening) and shortness of breath (chronic - not worsening). Cardiovascular:  Negative for chest pain. Gastrointestinal:  Positive for abdominal pain. Negative for blood in stool, diarrhea, nausea and vomiting. Genitourinary:  Negative for difficulty urinating and hematuria. Skin:  Negative for color change and rash. Neurological:  Negative for dizziness and headaches. Psychiatric/Behavioral:  Negative for dysphoric mood. Objective:    Physical Exam:  Vitals:    09/09/22 1454 09/09/22 1500   BP: (!) 140/80 126/82   Site: Right Upper Arm Right Upper Arm   Position: Sitting Sitting   Cuff Size: Small Adult Large Adult   Pulse: 79    Temp: 98.2 °F (36.8 °C)    TempSrc: Temporal    SpO2: 98%    Weight: 100 lb 3.2 oz (45.5 kg)    Height: 5' 4\" (1.626 m)      Physical Exam  Constitutional:       General: She is not in acute distress. Appearance: Normal appearance. She is not ill-appearing. Comments: Frail and thin   HENT:      Head: Normocephalic and atraumatic. Right Ear: External ear normal.      Left Ear: External ear normal.   Eyes:      General: No scleral icterus. Cardiovascular:      Rate and Rhythm: Normal rate and regular rhythm. Pulses: Normal pulses. Heart sounds: Normal heart sounds. No murmur heard. No friction rub. No gallop. Pulmonary:      Effort: Pulmonary effort is normal. No respiratory distress. Breath sounds: Normal breath sounds. No stridor. No wheezing, rhonchi or rales. Chest:      Chest wall: No tenderness. Abdominal:      Palpations: Abdomen is soft. Skin:     General: Skin is warm and dry. Capillary Refill: Capillary refill takes less than 2 seconds. Neurological:      Mental Status: She is alert. Psychiatric:         Mood and Affect: Mood normal.         Behavior: Behavior normal.     Body mass index is 17.2 kg/m². Labs:  No results found for this or any previous visit (from the past 672 hour(s)). Imaging:  [facility] DIGITAL SCREENING AUGMENTED BILATERAL    (Results Pending)   DEXA BONE DENSITY AXIAL SKELETON    (Results Pending)         ASSESSMENT & PLAN:    Sarah Kidd is a 81y.o. year old female here for [address] and Hypertension  . The following is a summary of the plan made at this visit:    1.  Early satiety  Assessment & Plan:  Poorly controlled and given age and no prior colonoscopy, alarm sx and need for GI referral, placed today. Counseled on PPIs and avoiding long-term use. DEXA also ordered. Orders:  -     Keren June MD, Gastroenterology, [facility]  2. Primary hypertension  Assessment & Plan:  Well controlled on manual repeat. ACEi refilled, reports tolerating well and reports adherence. Blood Pressure Goal:  [ ] < 130/80 (ACC/AHA)  [x] < 140/90 (JNC-8 for age < 61, or CKD, or DM)  [ ] < 150/90 (JNC-8 for age > 61)    Orders:  -     lisinopril (PRINIVIL;ZESTRIL) 10 MG tablet; Take 1 tablet by mouth daily, Disp-180 tablet, R-0**Patient requests 90 days supply**ZERO refills remain on this prescription. Your patient is requesting advance approval of refills for this medication to [address]  3. Encounter for screening mammogram for malignant neoplasm of breast  Assessment & Plan:  Age appropriate screening provided as per orders below. Orders:  -     SAN[facility]E DIGITAL SCREENING AUGMENTED BILATERAL; Future  4. Postmenopausal  Assessment & Plan:  Age appropriate screening provided as per orders below. Orders:  -     DEXA BONE DENSITY AXIAL SKELETON; Future    I attest that on 09/09/22 , I spent a total of 42 minutes, in addition to face-to-face time during the visit, reviewing the patient's records and labs before the visit, reviewing the patient's records and labs with the patient/guardian in the visit, counseling the patient/guardian on the above noted plan, ordering medications, ordering imaging studies (for example, x-rays, CT, MRI, ultrasound), placing and coordinating referrals, and documenting the encounter after the visit.

## 2022-09-09 NOTE — PATIENT INSTRUCTIONS
To schedule an appointment at any of our five imaging locations below, please call [phone]:    1) [facility]  [address], [address]    2) [facility] [place] [id number]  [address], [address]    3) [facility]  [address], [address], [place]    4) [address] [place] [address], [address] [place] [address], [address]    5) [address] [place] [address], [facility] [address]  [place], [address]

## 2022-09-23 ENCOUNTER — OFFICE VISIT (OUTPATIENT)
Dept: PRIMARY CARE CLINIC | Age: 72
End: 2022-09-23
Payer: MEDICARE

## 2022-09-23 VITALS
HEIGHT: 64 IN | HEART RATE: 88 BPM | OXYGEN SATURATION: 96 % | DIASTOLIC BLOOD PRESSURE: 80 MMHG | TEMPERATURE: 97.9 F | BODY MASS INDEX: 16.9 KG/M2 | SYSTOLIC BLOOD PRESSURE: 132 MMHG | WEIGHT: 99 LBS | RESPIRATION RATE: 17 BRPM

## 2022-09-23 DIAGNOSIS — M25.50 ARTHRALGIA, UNSPECIFIED JOINT: ICD-10-CM

## 2022-09-23 DIAGNOSIS — G89.29 CHRONIC LEFT SHOULDER PAIN: Primary | ICD-10-CM

## 2022-09-23 DIAGNOSIS — R00.2 PALPITATIONS: ICD-10-CM

## 2022-09-23 DIAGNOSIS — M25.512 CHRONIC LEFT SHOULDER PAIN: Primary | ICD-10-CM

## 2022-09-23 PROBLEM — T46.4X5A COUGH DUE TO ACE INHIBITOR: Status: RESOLVED | Noted: 2021-03-11 | Resolved: 2022-09-23

## 2022-09-23 PROBLEM — H10.13 ALLERGIC CONJUNCTIVITIS OF BOTH EYES: Status: RESOLVED | Noted: 2021-05-27 | Resolved: 2022-09-23

## 2022-09-23 PROBLEM — J41.0 SIMPLE CHRONIC BRONCHITIS (HCC): Status: RESOLVED | Noted: 2020-06-29 | Resolved: 2022-09-23

## 2022-09-23 PROBLEM — R05.8 COUGH DUE TO ACE INHIBITOR: Status: RESOLVED | Noted: 2021-03-11 | Resolved: 2022-09-23

## 2022-09-23 PROCEDURE — 99214 OFFICE O/P EST MOD 30 MIN: CPT | Performed by: FAMILY MEDICINE

## 2022-09-23 PROCEDURE — 1123F ACP DISCUSS/DSCN MKR DOCD: CPT | Performed by: FAMILY MEDICINE

## 2022-09-23 ASSESSMENT — ENCOUNTER SYMPTOMS
VOMITING: 0
BLOOD IN STOOL: 0
SHORTNESS OF BREATH: 0
COUGH: 1
COLOR CHANGE: 0
RHINORRHEA: 0
NAUSEA: 0
DIARRHEA: 0

## 2022-09-23 ASSESSMENT — PATIENT HEALTH QUESTIONNAIRE - PHQ9
SUM OF ALL RESPONSES TO PHQ QUESTIONS 1-9: 0
2. FEELING DOWN, DEPRESSED OR HOPELESS: 0
SUM OF ALL RESPONSES TO PHQ QUESTIONS 1-9: 0
SUM OF ALL RESPONSES TO PHQ QUESTIONS 1-9: 0
1. LITTLE INTEREST OR PLEASURE IN DOING THINGS: 0
SUM OF ALL RESPONSES TO PHQ QUESTIONS 1-9: 0
SUM OF ALL RESPONSES TO PHQ9 QUESTIONS 1 & 2: 0

## 2022-09-23 NOTE — PROGRESS NOTES
Aimee Babin is a 81y.o. year old female here for:    Chief Complaint:    Chief Complaint   Patient presents with    Shoulder Pain     Subjective: Today, her current concerns include:    HPI:  #Left Shoulder:  - Onset: Year ago  - Context: Inhibiting her function, can't wash her hair or use her arm. Denied falls, trauma or surgeries to shoulder.  - Location: Left shoulder  - Quality: Sharp  - Radiation: None  - Severity: At worst 8/10 pain, currently 6/10  - Timing/Duration: Constant and daily  - Progression: Worsening  - Modifiers: Worse if she moves her arm and even when just moving her fingers. Heating pad helps. Some rest helps. - Associated Symptoms: Per ROS as otherwise stated in this note  - Previous occurrence: Never before    #Palpitations:  - Onset: Last few months  - Context: Never with chest pain. Normal TSH in May. Reports she drinks 1 bottle of water per day.   - Quality: Just heart racing sensation  - Timing/Duration: Intermittent, comes and goes  - Progression: Stable  - Modifiers: Worse when she walks sometimes or when she exercising a lot  - Associated Symptoms: Per ROS as otherwise stated in this note  - Previous occurrence: Never before    Past Medical History:   Diagnosis Date    Anxiety     Asthma     COPD (chronic obstructive pulmonary disease) ([facility])     Emphysema of lung ([facility] [address]     Hypertension      Social History     Tobacco Use    Smoking status: Every Day     Packs/day: 0.65     Years: 42.00     Pack years: 27.30     Types: Cigarettes     Start date: 5/27/1973    Smokeless tobacco: Never    Tobacco comments:     09/08/22 3-4 cigs a day, per smoking hx audit, pt smoked 40 years in 2020, at heaviest pt smoked 1 ppd, current 0.25 ppd, average 0.65   Vaping Use    Vaping Use: Never used   Substance Use Topics    Alcohol use: No    Drug use: No     Family History   Problem Relation Age of Onset    High Blood Pressure Mother     Asthma Mother     Cancer Father         renal cell carcinoma distress. Appearance: Normal appearance. She is not ill-appearing, toxic-appearing or diaphoretic. Comments: Thin and frail   HENT:      Head: Normocephalic and atraumatic. Right Ear: External ear normal.      Left Ear: External ear normal.   Eyes:      General: No scleral icterus. Cardiovascular:      Rate and Rhythm: Normal rate and regular rhythm. Pulses: Normal pulses. Heart sounds: Normal heart sounds. No murmur heard. No friction rub. No gallop. Pulmonary:      Effort: Pulmonary effort is normal. No respiratory distress. Breath sounds: Normal breath sounds. No stridor. No wheezing, rhonchi or rales. Chest:      Chest wall: No tenderness. Abdominal:      Palpations: Abdomen is soft. Musculoskeletal:      Comments: Left shoulder exam: Overlying skin normal. ROM restricted on passive and active flexion, extension, internal and external rotation due to pain. TTP noted along scapula, Ac joint and clavicle. Negative tests: Spurling and Drop tests (although not assessed fully due to restricted ROM). Positive tests: Welby Ramsey, Crossover and Empty-Can. Skin:     General: Skin is warm and dry. Capillary Refill: Capillary refill takes less than 2 seconds. Neurological:      Mental Status: She is alert. Psychiatric:         Mood and Affect: Mood normal.         Behavior: Behavior normal.     Body mass index is 16.99 kg/m². Labs:  No results found for this or any previous visit (from the past 672 hour(s)). Imaging:  No orders to display     ASSESSMENT & PLAN:    Sol Mercado is a 81y.o. year old female here for Shoulder Pain  . The following is a summary of the plan made at this visit:    1. Chronic left shoulder pain  Assessment & Plan:  Poorly controlled. Possibly frozen joint or starting to get there. ?RA (Please see Arthralgia for more notes). Offered PT, she would prefer to start with imaging and eval. Referral placed to ortho today.  Encouraged heat and mobilizing/stretching joint. Orders:  -     Duglas Grove MD, Orthopedic Surgery (Hip; Knee; Shoulder), [facility]  2. Palpitations  Assessment & Plan:  None today but poorly controlled and she reports \"feels like a panic attack\". Given age and tobacco use, would want cardiac eval. Normal heart exam today. TSH wnl 4 months ago. Referral to cardiology placed today, may benefit from event/Holter monitor and/or echo. No chest pain with this. Hydration encouraged. She denied ETOH or recreational drug use. Call back/ED precautions discussed. Orders:  -     Merced Silveira MD, Cardiology, [facility]  3. Arthralgia, unspecified joint  Assessment & Plan:  Poorly controlled, chronic. Patient reports she believes she may have had a Dx of RA in the past but was unsure. Pain in shoulders L>R, hips, back, knees and hands. Will order some labs for eval as noted below and if + will place referral to rheum for assistance. Previously ordered DEXA, reminder her to get this done and provided locations to do so. Orders:  -     CYCLIC CITRUL PEPTIDE ANTIBODY, IGG; Future  -     RHEUMATOID FACTOR; Future  -     VICENTA; Future  -     Sedimentation Rate; Future  -     C-Reactive Protein; Future     I attest that on 09/23/22 , I spent a total of 53 minutes, in addition to face-to-face time during the visit, reviewing the patient's records and labs with the patient/guardian in the visit, counseling the patient/guardian on the above noted plan, ordering blood work and/or urine, placing and coordinating referrals, and documenting the encounter after the visit.

## 2022-09-23 NOTE — ASSESSMENT & PLAN NOTE
Poorly controlled. Possibly frozen joint or starting to get there. ?RA (Please see Arthralgia for more notes). Offered PT, she would prefer to start with imaging and eval. Referral placed to ortho today. Encouraged heat and mobilizing/stretching joint.

## 2022-09-23 NOTE — ASSESSMENT & PLAN NOTE
Poorly controlled, chronic. Patient reports she believes she may have had a Dx of RA in the past but was unsure. Pain in shoulders L>R, hips, back, knees and hands. Will order some labs for eval as noted below and if + will place referral to rheum for assistance. Previously ordered DEXA, reminder her to get this done and provided locations to do so.

## 2022-09-23 NOTE — ASSESSMENT & PLAN NOTE
None today but poorly controlled and she reports \"feels like a panic attack\". Given age and tobacco use, would want cardiac eval. Normal heart exam today. TSH wnl 4 months ago. Referral to cardiology placed today, may benefit from event/Holter monitor and/or echo. No chest pain with this. Hydration encouraged. She denied ETOH or recreational drug use. Call back/ED precautions discussed.

## 2022-09-23 NOTE — PATIENT INSTRUCTIONS
To schedule an appointment at any of our five imaging locations below, please call [phone]:    1) [facility]  [address], [address]    2) [facility] [place] [address], [address]    3) [facility]  [address], [facility] [place]    4) [address] [place] [address], [address] [place] [address], [address]    5) [address] [place] [address] Street, [address] [facility] [address]  [place], [address]

## 2022-10-06 ENCOUNTER — TELEPHONE (OUTPATIENT)
Dept: PULMONOLOGY | Age: 72
End: 2022-10-06

## 2022-10-06 NOTE — TELEPHONE ENCOUNTER
Called patient about missed appointment and LVM to call back and reschedule. Patient did not show for OV  with Dr. Carmita Salazar on 10/6/22. Reason:  na    This is patient's first no show. Patient was ano show on: na.      Patient did not reschedule.   Reschedule date:  na

## 2022-10-09 PROBLEM — Z12.31 ENCOUNTER FOR SCREENING MAMMOGRAM FOR MALIGNANT NEOPLASM OF BREAST: Status: RESOLVED | Noted: 2022-09-09 | Resolved: 2022-10-09

## 2022-10-17 ENCOUNTER — HOSPITAL ENCOUNTER (OUTPATIENT)
Dept: PULMONOLOGY | Age: 72
Discharge: HOME | End: 2022-10-17
Payer: MEDICARE

## 2022-10-17 VITALS — OXYGEN SATURATION: 98 %

## 2022-10-17 DIAGNOSIS — J43.2 CENTRILOBULAR EMPHYSEMA (HCC): ICD-10-CM

## 2022-10-17 LAB
DLCO %PRED: 34 %
DLCO PRED: NORMAL
DLCO/VA %PRED: NORMAL
DLCO/VA PRED: NORMAL
DLCO/VA: NORMAL
DLCO: NORMAL
EXPIRATORY TIME-POST: NORMAL
EXPIRATORY TIME: NORMAL
FEF 25-75% %CHNG: NORMAL
FEF 25-75% %PRED-POST: NORMAL
FEF 25-75% %PRED-PRE: NORMAL
FEF 25-75% PRED: NORMAL
FEF 25-75%-POST: NORMAL
FEF 25-75%-PRE: NORMAL
FEV1 %PRED-POST: 62 %
FEV1 %PRED-PRE: 55 %
FEV1 PRED: NORMAL
FEV1-POST: NORMAL
FEV1-PRE: NORMAL
FEV1/FVC %PRED-POST: NORMAL
FEV1/FVC %PRED-PRE: NORMAL
FEV1/FVC PRED: NORMAL
FEV1/FVC-POST: 47 %
FEV1/FVC-PRE: 43 %
FVC %PRED-POST: NORMAL
FVC %PRED-PRE: NORMAL
FVC PRED: NORMAL
FVC-POST: 99 L
FVC-PRE: 97 L
GAW %PRED: NORMAL
GAW PRED: NORMAL
GAW: NORMAL
IC %PRED: NORMAL
IC PRED: NORMAL
IC: NORMAL
MEP: NORMAL
MIP: NORMAL
MVV %PRED-PRE: NORMAL
MVV PRED: NORMAL
MVV-PRE: NORMAL
PEF %PRED-POST: NORMAL
PEF %PRED-PRE: NORMAL
PEF PRED: NORMAL
PEF%CHNG: NORMAL
PEF-POST: NORMAL
PEF-PRE: NORMAL
RAW %PRED: NORMAL
RAW PRED: NORMAL
RAW: NORMAL
RV %PRED: NORMAL
RV PRED: NORMAL
RV: NORMAL
SVC %PRED: NORMAL
SVC PRED: NORMAL
SVC: NORMAL
TLC %PRED: 121 %
TLC PRED: NORMAL
TLC: NORMAL
VA %PRED: NORMAL
VA PRED: NORMAL
VA: NORMAL
VTG %PRED: NORMAL
VTG PRED: NORMAL
VTG: NORMAL

## 2022-10-17 PROCEDURE — 6370000000 HC RX 637 (ALT 250 FOR IP): Performed by: INTERNAL MEDICINE

## 2022-10-17 PROCEDURE — 94729 DIFFUSING CAPACITY: CPT

## 2022-10-17 PROCEDURE — 94060 EVALUATION OF WHEEZING: CPT

## 2022-10-17 PROCEDURE — 94726 PLETHYSMOGRAPHY LUNG VOLUMES: CPT

## 2022-10-17 PROCEDURE — 94618 PULMONARY STRESS TESTING: CPT

## 2022-10-17 RX ORDER — ALBUTEROL SULFATE 90 UG/1
4 AEROSOL, METERED RESPIRATORY (INHALATION) ONCE
Status: COMPLETED | OUTPATIENT
Start: 2022-10-17 | End: 2022-10-17

## 2022-10-17 RX ADMIN — Medication 4 PUFF: at 13:19

## 2022-10-17 ASSESSMENT — PULMONARY FUNCTION TESTS
FEV1_PERCENT_PREDICTED_PRE: 55
FVC_PRE: 97
FEV1_PERCENT_PREDICTED_POST: 62
FEV1/FVC_PRE: 43
FEV1/FVC_POST: 47
FVC_POST: 99

## 2022-10-17 NOTE — PROCEDURES
[address] [place] [address] Pulmonary Function Lab - Six Minute Walk      Test Performed on:   Room Air__X____   Oxygen at _____ lpm via N/C- continuous Oxygen at _____ lpm via N/C- OCD  Assist Device Used During Test:  None___X___ Cane________ Walker_________    Modified Papo's Scale  0 Nothing at all 5 Strong    0.5 Just noticeable 6 Stronger (Hard)    1 Very Light 7 Very Strong   2 Light 8 Very Very Strong   3 Moderate 9 Extremely Strong   4 Somewhat Strong 10 Maximum All out      Time SpO2 Heart Rate Respiratory Rate Dyspnea-  Modified Papos Scale Fatigue- Modified Papos Scale Other Symptoms   Baseline                     98% RA 97 18 0       1 minute                     96%  20 1     2 minutes                     96%  24 3       3 minutes                     95%  26 5     4 minutes                     98%  28 5     5 minutes                     97%  28 6     6 minutes                     96%  30 7     Recovery x 1 minute                     97%  26 4     Recovery x 2 Minute                      99%  22 1      Number of Laps____10_____ X 120 feet + _________ additional feet = Total Distance ____1200___feet   Stopped or paused before 6 minutes? No____X___ Yes ________  Total expected 6 MW distance is ____1645___feet. Patient achieved ____73__% of expected distance.    Pre Blood Pressure: 122/73  Post Blood Pressure: 115/74  Other symptoms at the end of exercise:

## 2022-10-22 NOTE — PROCEDURES
[facility]                               PULMONARY FUNCTION    PATIENT NAME: Edmundo Nelson                     :        1950  MED REC NO:   [id number]                          ROOM:  ACCOUNT NO:   [de-identified]                           ADMIT DATE: 10/17/2022  PROVIDER:     Nawaf Smith MD    DATE OF PROCEDURE:  10/17/2022    PFT    This is a full PFT. FEV1 of 1.26, 55% predicted, FVC of 2.90, 97%  predicted, FEV1 to FVC ratio of 43% showing a moderate obstructive lung  defect with a significant bronchodilator effect. There is air trapping  and hyperinflation and diffusion is low even when adjusted for alveolar  ventilation. IMPRESSION:  Moderately severe obstructive lung defect with air trapping  and hyperinflation. Positive bronchodilator effect and low DLCO. Flow-volume loops are consistent with the diagnosis of COPD.         Antoni Bella MD    D: 10/21/2022 12:29:50       T: 10/21/2022 14:59:04     [id number]  Job#: [id number]     Doc#: [id number]    CC:

## 2022-10-22 NOTE — PROCEDURES
[facility]                               PULMONARY FUNCTION    PATIENT NAME: Sepideh Horton                     :        1950  MED REC NO:   [id number]                          ROOM:  ACCOUNT NO:   [de-identified]                           ADMIT DATE: 10/17/2022  PROVIDER:     Sharif Szymanski MD    DATE OF PROCEDURE:  10/17/2022    SIX-MINUTE WALK TEST    Starting room air O2 sat of 98% and the patient was able to complete the  six-minute walk test without any desaturation. IMPRESSION:  Total distance covered was 1645 feet and 73% of the  predicted distance. No desaturation.   Normal six-minute walk test.        Niru Harris MD    D: 10/21/2022 12:29:50       T: 10/21/2022 15:04:34     [email or web address]  Job#: [id number]     Doc#: [id number]    CC:

## 2022-11-04 DIAGNOSIS — I10 PRIMARY HYPERTENSION: ICD-10-CM

## 2022-11-04 RX ORDER — LISINOPRIL 10 MG/1
10 TABLET ORAL DAILY
Qty: 180 TABLET | Refills: 0 | Status: SHIPPED | OUTPATIENT
Start: 2022-11-04 | End: 2022-11-10

## 2022-11-04 NOTE — TELEPHONE ENCOUNTER
Pt called because she's out of her BP medication. Pt states she's always taken 2 pills a day since she's been on lisinopril. Pt understands the Rx directions of 1 pill per day but she's afraid to take only one pill. Please call pt.     LOV 09/23/2022    FOV 12/20/2022

## 2022-11-10 ENCOUNTER — TELEPHONE (OUTPATIENT)
Dept: PRIMARY CARE CLINIC | Age: 72
End: 2022-11-10

## 2022-11-10 DIAGNOSIS — I10 PRIMARY HYPERTENSION: ICD-10-CM

## 2022-11-10 RX ORDER — LISINOPRIL 20 MG/1
20 TABLET ORAL DAILY
Qty: 90 TABLET | Refills: 0 | Status: SHIPPED | OUTPATIENT
Start: 2022-11-10 | End: 2023-02-08

## 2022-11-10 RX ORDER — FLUTICASONE PROPIONATE 50 MCG
SPRAY, SUSPENSION (ML) NASAL
COMMUNITY
Start: 2022-10-30

## 2022-11-10 NOTE — PROGRESS NOTES
Spoke with the patient and did a med rec, she has been on lisinopril 10 mg BID and chart review agrees with this. I sent in lisinopril 20 mg QD and advised she should just take daily. She was okay with this and voiced understanding. Call back/ED precautions discussed.
Statement Selected

## 2022-11-10 NOTE — TELEPHONE ENCOUNTER
Pt returned Tams' call. Please call regarding her taking medication more than prescribed.     LOV 09/23/2022    FOV 12/20/2022

## 2022-11-10 NOTE — TELEPHONE ENCOUNTER
Received a call that the patient is taking her medication differently than prescribed and is doubling dose. Called patient about this X2, no answer received, message left asking for call back. Will try again.

## 2022-12-20 ENCOUNTER — OFFICE VISIT (OUTPATIENT)
Dept: PRIMARY CARE CLINIC | Age: 72
End: 2022-12-20

## 2022-12-20 VITALS
TEMPERATURE: 97.8 F | HEIGHT: 62 IN | SYSTOLIC BLOOD PRESSURE: 119 MMHG | HEART RATE: 58 BPM | BODY MASS INDEX: 17.92 KG/M2 | OXYGEN SATURATION: 97 % | WEIGHT: 97.4 LBS | DIASTOLIC BLOOD PRESSURE: 62 MMHG

## 2022-12-20 DIAGNOSIS — Z00.00 HEALTHCARE MAINTENANCE: Primary | ICD-10-CM

## 2022-12-20 DIAGNOSIS — I10 PRIMARY HYPERTENSION: ICD-10-CM

## 2022-12-20 DIAGNOSIS — Z23 IMMUNIZATION DUE: ICD-10-CM

## 2022-12-20 DIAGNOSIS — Z11.59 ENCOUNTER FOR HEPATITIS C SCREENING TEST FOR LOW RISK PATIENT: ICD-10-CM

## 2022-12-20 DIAGNOSIS — R73.9 ELEVATED BLOOD SUGAR: ICD-10-CM

## 2022-12-20 DIAGNOSIS — E78.00 ELEVATED LDL CHOLESTEROL LEVEL: ICD-10-CM

## 2022-12-20 RX ORDER — ZOSTER VACCINE RECOMBINANT, ADJUVANTED 50 MCG/0.5
0.5 KIT INTRAMUSCULAR ONCE
Qty: 1 EACH | Refills: 0 | Status: SHIPPED | OUTPATIENT
Start: 2022-12-20 | End: 2022-12-20

## 2022-12-20 RX ORDER — ZOSTER VACCINE RECOMBINANT, ADJUVANTED 50 MCG/0.5
0.5 KIT INTRAMUSCULAR ONCE
Qty: 1 EACH | Refills: 0 | Status: SHIPPED | OUTPATIENT
Start: 2022-12-20 | End: 2022-12-20 | Stop reason: SDUPTHER

## 2022-12-20 RX ORDER — LISINOPRIL 10 MG/1
10 TABLET ORAL DAILY
Qty: 30 TABLET | Refills: 1 | Status: SHIPPED | OUTPATIENT
Start: 2022-12-20 | End: 2023-02-18

## 2022-12-20 ASSESSMENT — PATIENT HEALTH QUESTIONNAIRE - PHQ9
SUM OF ALL RESPONSES TO PHQ QUESTIONS 1-9: 0
SUM OF ALL RESPONSES TO PHQ QUESTIONS 1-9: 0
1. LITTLE INTEREST OR PLEASURE IN DOING THINGS: 0
SUM OF ALL RESPONSES TO PHQ9 QUESTIONS 1 & 2: 0
SUM OF ALL RESPONSES TO PHQ QUESTIONS 1-9: 0
2. FEELING DOWN, DEPRESSED OR HOPELESS: 0
SUM OF ALL RESPONSES TO PHQ QUESTIONS 1-9: 0

## 2022-12-20 ASSESSMENT — ENCOUNTER SYMPTOMS
VOMITING: 0
DIARRHEA: 0
SHORTNESS OF BREATH: 0
RHINORRHEA: 0
NAUSEA: 0
BLOOD IN STOOL: 0
COUGH: 0

## 2022-12-20 ASSESSMENT — LIFESTYLE VARIABLES
HOW OFTEN DO YOU HAVE A DRINK CONTAINING ALCOHOL: NEVER
HOW MANY STANDARD DRINKS CONTAINING ALCOHOL DO YOU HAVE ON A TYPICAL DAY: PATIENT DOES NOT DRINK

## 2022-12-20 NOTE — ASSESSMENT & PLAN NOTE
Poorly controlled too aggressive and increases risk fr fall. Lisinopril decreased today. Scheduled for BP repeat in 2 weeks. Asymptomatic. Fall/call back/ED precautions discussed.     Blood Pressure Goal:  [ ] < 130/80 (ACC/AHA)  [x] < 140/90 (JNC-8 for age < 61, or CKD, or DM)  [ ] < 150/90 (JNC-8 for age > 61)

## 2022-12-20 NOTE — PROGRESS NOTES
Marissa Chapman is a 81y.o. year old female here for:    Chief Complaint:    Chief Complaint   Patient presents with    Annual Exam     Patient's Sex: female Medicare Member ID: [id number] - (Medicare)    Ethnicity:    Non- / Non   Race:   White (non-)    Provider Name:  Moncho Hernandez M.D. [facility]  [address]  [place], [address] [facility]  Dept: [phone]  Dept Fax: [phone]    Provider ID Type: NPI  Billing Provider ID:  [id number]    Patient Care Team:  Moncho Hernandez MD as PCP - General (Family Medicine)  Moncho Hernandez MD as PCP - [facility] Provider  Elmer Hernandez MD as Consulting Physician (Pulmonology)    Current concerns:    None. Medical History:  Past Medical History:   Diagnosis Date    Anxiety     Asthma     COPD (chronic obstructive pulmonary disease) ([facility]     Emphysema of lung ([facility]     Hypertension        Patient Active Problem List   Diagnosis    Essential hypertension    Pure hypercholesterolemia    Gastroesophageal reflux disease without esophagitis    Chronic obstructive pulmonary disease with acute exacerbation (HCC)    Chronic allergic rhinitis    Tobacco use disorder    Lumbar degenerative disc disease    Centrilobular emphysema (HCC)    Anxiety    Observed sleep apnea    Early satiety    Primary hypertension    Postmenopausal    Chronic left shoulder pain    Palpitations    Arthralgia    Immunization due    Elevated LDL cholesterol level    Elevated blood sugar    Encounter for hepatitis C screening test for low risk patient    Healthcare maintenance     Social History:  Social History     Socioeconomic History    Marital status:       Spouse name: None    Number of children: None    Years of education: None    Highest education level: None   Tobacco Use    Smoking status: Every Day     Packs/day: 0.65     Years: 42.00     Pack years: 27.30     Types: Cigarettes     Start date: 5/27/1973    Smokeless tobacco: Never Tobacco comments:     09/08/22 3-4 cigs a day, per smoking hx audit, pt smoked 40 years in 2020, at heaviest pt smoked 1 ppd, current 0.25 ppd, average 0.65   Vaping Use    Vaping Use: Never used   Substance and Sexual Activity    Alcohol use: No    Drug use: No    Sexual activity: Never     Social Determinants of Health     Physical Activity: Inactive    Days of Exercise per Week: 0 days    Minutes of Exercise per Session: 0 min       Social Determinants of Health     Tobacco Use: High Risk    Smoking Tobacco Use: Every Day    Smokeless Tobacco Use: Never    Passive Exposure: Not on file   Alcohol Use: Not At Risk    Frequency of Alcohol Consumption: Never    Average Number of Drinks: Patient does not drink    Frequency of Binge Drinking: Never   Financial Resource Strain: Not on file   Food Insecurity: Not on file   Transportation Needs: Not on file   Physical Activity: Inactive    Days of Exercise per Week: 0 days    Minutes of Exercise per Session: 0 min   Stress: Not on file   Social Connections: Not on file   Intimate Partner Violence: Not on file   Depression: Not at risk    PHQ-2 Score: 0   Housing Stability: Not on file       Diet: generalHas difficulty with food, has early satiety, has not f/u with GI since she cares for her mom. Family History:  Family History   Problem Relation Age of Onset    High Blood Pressure Mother     Asthma Mother     Cancer Father         renal cell carcinoma    Other Sister         fibromyalgia    Other Maternal Grandfather         black lung    Colon Cancer Neg Hx     Breast Cancer Neg Hx        Surgeries:  Past Surgical History:   Procedure Laterality Date    CARPAL TUNNEL RELEASE Bilateral     EYE SURGERY      KNEE SURGERY Left     SINUS SURGERY      SKIN CANCER EXCISION      basal cell removal under left eye       Allergies:   Allergies   Allergen Reactions    Amoxicillin Other (See Comments)    Ampicillin Other (See Comments)    Doxycycline     Lidocaine Other (See Comments)       Medications:  Current Outpatient Medications on File Prior to Visit   Medication Sig Dispense Refill    fluticasone (FLONASE) 50 MCG/ACT nasal spray       TRELEGY ELLIPTA 100-62.5-25 MCG/INH AEPB INHALE 1 PUFF INTO THE LUNGS DAILY 120 each 1    melatonin 3 MG TABS tablet Take 3 mg by mouth nightly as needed      albuterol sulfate HFA (PROAIR HFA) 108 (90 Base) MCG/ACT inhaler Inhale 2 puffs into the lungs every 6 hours as needed for Wheezing 1 each 5     No current facility-administered medications on file prior to visit. Immunizations:  Immunization History   Administered Date(s) Administered    COVID-19, PFIZER GRAY top, DO NOT Dilute, (age 15 y+), IM, 30 mcg/0.3 mL 06/04/2022    COVID-19, PFIZER PURPLE top, DILUTE for use, (age 15 y+), 30mcg/0.3mL 03/11/2021, 04/01/2021, 11/29/2021    Influenza, FLUAD, (age 72 y+), Adjuvanted, 0.5mL 11/17/2021    Influenza, FLUZONE (age 72 y+), High Dose, 0.7mL 10/08/2022     Preventive Services:    Health Maintenance History:  Patient exercises regularly? Stays active  Dental: Has uppers and lowers.  There is appropriate practice of daily oral hygiene? yes  Eyes: Next visit scheduled in 2 months  PFT (if patient has COPD dx - yearly): scheduled but missed, she needs to call to reschedule    Other Health Maintenance History:  Patient has previous history of abnormal breast mass?: no  Patient has previous history of abnormal pap smear?:  no  Patient is on Hormone Replacement therapy or Birth Control? no    Health Maintenance Screening:  Diabetes screening: was provided today  Cholesterol screening: was provided today  Pelvic exam and pap smear: was not performed today today - N/A  Screening mammogram order slip: was provided (no personal or family history of breast CA) previously, reminder today  Bone Density test order: was provided previously, reminder today  Colorectal cancer screening (Screening colonoscopy) order: was provided (no personal or family history of colon CA) previously, reminder today  Lung Cancer Screen:was provided today - UTD on this and negative from June 2022  Hep C screening: was provided today  HIV screening: was not applicable to this patient based on their risk factors and evidence based recommendations today    Immunizations:   Pneumonia vaccine: was provided today  TDAP vaccine: declined today, counseling provided and advised that should she want this, we could provide at a later date. today  Flu vaccine: was not provided today - UTD on this  Hepatitis B vaccine: declined today, counseling provided and advised that should she want this, we could provide at a later date. today  Shingles vaccine: Rx provided to pharmacy today  HPV vaccine: was not applicable to this patient based on their risk factors and evidence based recommendations today    ++++++++++++++++++++++  Lab Results   Component Value Date    CREATININE 1.1 05/09/2022     Lab Results   Component Value Date    BUN 28 (H) 05/09/2022     No results found for: DRUGSCR  Cardiovascular Disease Screening:  Lab Results   Component Value Date    [address] (H) 05/09/2022       Diabetes Screening & Management:  No components found for: HGBA1C    No results found for: LABPROT  No results found for: LABMICR    No results found for this or any previous visit.       General Health and ACP:  General  In general, how would you say your health is?: Good  In the past 7 days, have you experienced any of the following: New or Increased Pain, New or Increased Fatigue, Loneliness, Social Isolation, Stress or Anger?: No  Do you get the social and emotional support that you need?: Yes  Do you have a Living Will?: (!) No    Advance Directives       Power of  Living Will ACP-Advance Directive ACP-Power of     Not on File Not on File Not on File Not on File            **Assessments/Counseling: Information Obtained by Staff and Reviewed by Provider or was Conducted by Provider**    Other Assessments/Counseling:  Vitals:    12/20/22 1502 12/20/22 1510 12/20/22 1544   BP: (!) 110/54 (!) 108/54 119/62   Site: Left Upper Arm  Right Upper Arm   Position: Sitting  Sitting   Cuff Size: Medium Adult  Child   Pulse: 58     Temp: 97.8 °F (36.6 °C)     TempSrc: Temporal     SpO2: 97%     Weight: 97 lb 6.4 oz (44.2 kg)     Height: 5' 2.09\" (1.577 m)       Body mass index is 17.76 kg/m². Pain Screening:   No flowsheet data found. Pain Management Plan:  Patient pain is not well controlled - uses OTC Tylenol, would like PT at the beginning of the year. Depression Screening    A depression screening was performed today using the Patient Health Questionnaire PHQ-2/9. PHQ-2 Score/PHQ-9 Score:   PHQ-9  12/20/2022   Little interest or pleasure in doing things 0   Feeling down, depressed, or hopeless 0   PHQ-2 Score 0   PHQ-9 Total Score 0     For PHQ-2 score <4, further documentation is not necessary. Alcohol Use: In the past 3 months, on any single occasion have you had 5 or more drinks containing alcohol. No  Regarding your use of alcohol, have you ever felt you should cut down on your drinking? No    Functional Status Assessment:     Activities of Daily Living: The patient can currently perform all of the following instrumental activities of daily living (ADLs) shopping, shopping for groceries, using public transportation, meal preparation, housework, laundry, taking medications, and handling finances. The patient is also proficient with the following bathing, dressing, eating, transferring, using toilet, and walking.     How does the patient maintain positive mental well-being?  visiting family/friends    How does the patient stay connected to family, friends and/or others in the community:    Regular in person contact and by phone    Any Vision Problems:  Yes - scheduled with eye doctor in 2 months    Any Hearing Problems:  No    Fall Risk Assessment:  Moderate fall risk assessed  - back pain and frailty  Amb Fall Risk Assessment and TUG Test 12/20/2022   Do you feel unsteady or are you worried about falling?  no   2 or more falls in past year? no   Fall with injury in past year? no     Please see Assessment and Plan for management. Physical Activity Assessment:    The patient performs normal activities of daily living without assistance   The patient maintains a good energy level though appropriate exercise, stays active. Urinary Incontinence:   Patient is not having problems with urinary incontinence    **The following issues were addressed by the provider**    Have you thought about how you would like your health information shared with your family and/or others involved in your healthcare:  Discussed with patient options for designation of proxy on [facility] for sharing of information    Oral Health: There is appropriate practice of daily oral hygiene     Motor Vehicle Safety: Patient drives and wears a seatbelt     Home Safety:  Patient is active and performs all normal activities of daily living without assistance - no home safety issues need to be addressed     Cognitive Health: Patient reports no concerns with memory or cognition on their behalf or by family/friends     Sexual Health: There is no concern for at risk sexual behavior in this patient    Aspirin Use Discussion Performed:   N/A    Advance Directive (Living will):      Discussed advance directive with the patient today and given brochure    Annual Wellness follow up to be scheduled and counseling as noted below. Review of Systems:  Review of Systems   Constitutional:  Positive for unexpected weight change (loss). Negative for chills, diaphoresis and fever. HENT:  Negative for congestion and rhinorrhea. Respiratory:  Negative for cough and shortness of breath. Cardiovascular:  Negative for chest pain. Gastrointestinal:  Negative for blood in stool, diarrhea, nausea and vomiting.         Early satiety   Genitourinary: Negative for difficulty urinating and hematuria. Skin:  Negative for rash. Neurological:  Negative for dizziness and headaches. Psychiatric/Behavioral:  Negative for dysphoric mood. Objective:    Physical Exam:  Vitals:    12/20/22 1502 12/20/22 1510 12/20/22 1544   BP: (!) 110/54 (!) 108/54 119/62   Site: Left Upper Arm  Right Upper Arm   Position: Sitting  Sitting   Cuff Size: Medium Adult  Child   Pulse: 58     Temp: 97.8 °F (36.6 °C)     TempSrc: Temporal     SpO2: 97%     Weight: 97 lb 6.4 oz (44.2 kg)     Height: 5' 2.09\" (1.577 m)       Physical Exam  Constitutional:       General: She is not in acute distress. Appearance: Normal appearance. She is not ill-appearing, toxic-appearing or diaphoretic. HENT:      Head: Normocephalic and atraumatic. Right Ear: External ear normal.      Left Ear: External ear normal.   Eyes:      General: No scleral icterus. Cardiovascular:      Rate and Rhythm: Normal rate and regular rhythm. Pulses: Normal pulses. Heart sounds: Normal heart sounds. No murmur heard. No friction rub. No gallop. Pulmonary:      Effort: Pulmonary effort is normal. No respiratory distress. Breath sounds: No stridor. No wheezing, rhonchi or rales. Comments: Distant breath sounds - moving good air  Chest:      Chest wall: No tenderness. Abdominal:      Palpations: Abdomen is soft. Skin:     General: Skin is warm and dry. Capillary Refill: Capillary refill takes less than 2 seconds. Neurological:      Mental Status: She is alert. Psychiatric:         Mood and Affect: Mood normal.         Behavior: Behavior normal.     Body mass index is 17.76 kg/m². Labs:  No results found for this or any previous visit (from the past 672 hour(s)). Imaging:  No orders to display       ASSESSMENT & PLAN:    Marizol Sanon is a 81y.o. year old female here for an annual exam. The following is a summary of the plan made at this visit:    1.  Healthcare maintenance  Assessment & Plan:  Overall doing okay. Age appropriate screenings and immunizations provided as per orders below. Reminders provider as noted in Patient Instructions. 2. Elevated blood sugar  Assessment & Plan:  Age appropriate screening provided as per orders below. Orders:  -     Hemoglobin A1C; Future  3. Elevated LDL cholesterol level  Assessment & Plan:  Age appropriate screening provided as per orders below. Orders:  -     Lipid Panel; Future  4. Encounter for hepatitis C screening test for low risk patient  Assessment & Plan:  Age appropriate screening provided as per orders below. Orders:  -     Hepatitis C Antibody; Future  5. Primary hypertension  Assessment & Plan:  Poorly controlled too aggressive and increases risk fr fall. Lisinopril decreased today. Scheduled for BP repeat in 2 weeks. Asymptomatic. Fall/call back/ED precautions discussed. Blood Pressure Goal:  [ ] < 130/80 (ACC/AHA)  [x] < 140/90 (JNC-8 for age < 61, or CKD, or DM)  [ ] < 150/90 (JNC-8 for age > 61)        Orders:  -     lisinopril (PRINIVIL;ZESTRIL) 10 MG tablet; Take 1 tablet by mouth daily, Disp-30 tablet, R-1Normal  6. Immunization due  Assessment & Plan:  Age appropriate immunization provided as per orders below. Orders:  -     Pneumococcal, PCV20, PREVNAR 20, (age 25 yrs+), IM, PF  -     zoster recombinant adjuvanted vaccine (SHINGRIX) 50 MCG/0.5ML SUSR injection; Inject 0.5 mLs into the muscle once for 1 dose, Disp-1 each, R-0Normal    Education:  Routine anticipatory guidance provided. Other applicable patient education information provided in AVS.    Counseling  The patient was counseled regarding motor vehicle safety and sun exposure. If needed, the patient was counseled regarding diet and exercise. The patient was given information regarding the dangers of smoking and the overuse of alcohol. The patient was counseled regarding Living Will/Durable Power-Of-.     Diagnoses for This Visit: ICD-10-CM    1. Healthcare maintenance  Z00.00       2. Elevated blood sugar  R73.9 Hemoglobin A1C      3. Elevated LDL cholesterol level  E78.00 Lipid Panel      4. Encounter for hepatitis C screening test for low risk patient  Z11.59 Hepatitis C Antibody      5. Primary hypertension  I10 lisinopril (PRINIVIL;ZESTRIL) 10 MG tablet      6. Immunization due  Z23 Pneumococcal, PCV20, PREVNAR 20, (age 25 yrs+), IM, PF     zoster recombinant adjuvanted vaccine (SHINGRIX) 50 MCG/0.5ML SUSR injection     DISCONTINUED: zoster recombinant adjuvanted vaccine [facility]) 50 MCG/0.5ML SUSR injection          Physician Attestation: I saw and evaluated this patient and performed all elements of the service.

## 2022-12-20 NOTE — ASSESSMENT & PLAN NOTE
Overall doing okay. Age appropriate screenings and immunizations provided as per orders below. Reminders provider as noted in Patient Instructions.

## 2022-12-20 NOTE — PATIENT INSTRUCTIONS
List of reminders:    - Please call pulmonology (lung doctor) - to schedule with them  - Please call to schedule with GI (belly doctor for the appetite)  - Please call to schedule your mammogram   - Please call to schedule your bone density test (DEXA)   - Consider getting the shingles vaccine - sent to your pharmacy.

## 2023-01-13 ENCOUNTER — OFFICE VISIT (OUTPATIENT)
Dept: PRIMARY CARE CLINIC | Age: 73
End: 2023-01-13
Payer: MEDICARE

## 2023-01-13 VITALS
HEART RATE: 85 BPM | BODY MASS INDEX: 18.33 KG/M2 | SYSTOLIC BLOOD PRESSURE: 136 MMHG | DIASTOLIC BLOOD PRESSURE: 62 MMHG | RESPIRATION RATE: 15 BRPM | HEIGHT: 62 IN | TEMPERATURE: 98.2 F | OXYGEN SATURATION: 97 % | WEIGHT: 99.6 LBS

## 2023-01-13 DIAGNOSIS — I10 ESSENTIAL HYPERTENSION: Primary | ICD-10-CM

## 2023-01-13 DIAGNOSIS — J41.0 SIMPLE CHRONIC BRONCHITIS (HCC): ICD-10-CM

## 2023-01-13 PROCEDURE — 1123F ACP DISCUSS/DSCN MKR DOCD: CPT | Performed by: FAMILY MEDICINE

## 2023-01-13 PROCEDURE — G8484 FLU IMMUNIZE NO ADMIN: HCPCS | Performed by: FAMILY MEDICINE

## 2023-01-13 PROCEDURE — G8427 DOCREV CUR MEDS BY ELIG CLIN: HCPCS | Performed by: FAMILY MEDICINE

## 2023-01-13 PROCEDURE — 3075F SYST BP GE 130 - 139MM HG: CPT | Performed by: FAMILY MEDICINE

## 2023-01-13 PROCEDURE — G8400 PT W/DXA NO RESULTS DOC: HCPCS | Performed by: FAMILY MEDICINE

## 2023-01-13 PROCEDURE — G8419 CALC BMI OUT NRM PARAM NOF/U: HCPCS | Performed by: FAMILY MEDICINE

## 2023-01-13 PROCEDURE — 3017F COLORECTAL CA SCREEN DOC REV: CPT | Performed by: FAMILY MEDICINE

## 2023-01-13 PROCEDURE — 3078F DIAST BP <80 MM HG: CPT | Performed by: FAMILY MEDICINE

## 2023-01-13 PROCEDURE — 99213 OFFICE O/P EST LOW 20 MIN: CPT | Performed by: FAMILY MEDICINE

## 2023-01-13 PROCEDURE — 3023F SPIROM DOC REV: CPT | Performed by: FAMILY MEDICINE

## 2023-01-13 PROCEDURE — 1090F PRES/ABSN URINE INCON ASSESS: CPT | Performed by: FAMILY MEDICINE

## 2023-01-13 PROCEDURE — 4004F PT TOBACCO SCREEN RCVD TLK: CPT | Performed by: FAMILY MEDICINE

## 2023-01-13 RX ORDER — LISINOPRIL 10 MG/1
10 TABLET ORAL DAILY
Qty: 30 TABLET | Refills: 3 | Status: SHIPPED | OUTPATIENT
Start: 2023-01-13 | End: 2023-05-13

## 2023-01-13 ASSESSMENT — PATIENT HEALTH QUESTIONNAIRE - PHQ9
SUM OF ALL RESPONSES TO PHQ QUESTIONS 1-9: 0
2. FEELING DOWN, DEPRESSED OR HOPELESS: 0
SUM OF ALL RESPONSES TO PHQ9 QUESTIONS 1 & 2: 0
1. LITTLE INTEREST OR PLEASURE IN DOING THINGS: 0
SUM OF ALL RESPONSES TO PHQ QUESTIONS 1-9: 0

## 2023-01-13 ASSESSMENT — ENCOUNTER SYMPTOMS
RHINORRHEA: 0
NAUSEA: 0
DIARRHEA: 0
SHORTNESS OF BREATH: 0
BLOOD IN STOOL: 0
COUGH: 0
VOMITING: 0

## 2023-01-13 NOTE — PATIENT INSTRUCTIONS
Please find our [facility] below for your convenience! CENTRAL LOCATIONS    1) [address]  [address] [address] [address] [place], [address]  Phone: [phone]    2) [address]  [facility], [address]  Phone: Mark Mixon    3) [address]  [facility]  Phone: [phone]    3) [place]  [address] [facility], [facility]  Phone: [phone]    5) [facility]  [address] [facility]  Phone: [address]    6) [address] [facility], [address] [facility], [address]  Phone: [phone]    7) Mikhail Rued[address]  Phone: [phone]) [facility]  [address] [place], [place] [address] [address]  Phone: [phone]) [address] [place] [address] [address], [address]. [facility] [address] [facility]  Phone: [phone]  Phone: [address]    11) [facility] [address], [address] [facility]  Phone: [phone] [facility]  3/3/1999 [address] [facility], [address] [facility]  Phone: [address]) [address] [facility], [address]  [address], [address] [facility]  Phone: [phone]) [facility]   [address]  [place], [address] [facility]  Phone: [phone]

## 2023-01-13 NOTE — ASSESSMENT & PLAN NOTE
Well controlled. No recent H/I/E. On albuterol and Trelegy - to continue. UTD on immunizations. Call back/ED precautions discussed.

## 2023-01-13 NOTE — ASSESSMENT & PLAN NOTE
Well controlled now. Asymptomatic. Will see in 6 months for f/u. Call back/ED precautions discussed. Medication refilled today.     Blood Pressure Goal:  [ ] < 130/80 (ACC/AHA)  [ ] < 140/90 (JNC-8 for age < 61, or CKD, or DM)  [x] < 150/90 (JNC-8 for age > 61) - frail, age and risk for falls

## 2023-01-13 NOTE — PROGRESS NOTES
Yang Brice is a 81y.o. year old female here for:    Chief Complaint:    Chief Complaint   Patient presents with    Hypertension     Subjective: Today, her current concerns include:    HPI:  #HTN  - Onset: Years  - Context: Was seen about 1 month ago, was on lisinopril 20 mg QD, BP was 110/54. Med was decreased to 10 mg last visit. - Quality: Asymptomatic  - Timing/Duration: Constant and daily  - Progression: Improved  - Modifiers:  On lisinopril 10 mg QD  - Associated Symptoms: Per ROS as otherwise stated in this note    #COPD  - Stable no issues  - On albuterol and Trelegy  - UTD on immunizations    Past Medical History:   Diagnosis Date    Anxiety     Asthma     COPD (chronic obstructive pulmonary disease) ([facility])     Emphysema of lung ([facility] [address]     Hypertension      Social History     Tobacco Use    Smoking status: Every Day     Packs/day: 0.65     Years: 42.00     Pack years: 27.30     Types: Cigarettes     Start date: 5/27/1973    Smokeless tobacco: Never    Tobacco comments:     09/08/22 3-4 cigs a day, per smoking hx audit, pt smoked 40 years in 2020, at heaviest pt smoked 1 ppd, current 0.25 ppd, average 0.65   Vaping Use    Vaping Use: Never used   Substance Use Topics    Alcohol use: No    Drug use: No     Family History   Problem Relation Age of Onset    High Blood Pressure Mother     Asthma Mother     Cancer Father         renal cell carcinoma    Other Sister         fibromyalgia    Other Maternal Grandfather         black lung    Colon Cancer Neg Hx     Breast Cancer Neg Hx      Past Surgical History:   Procedure Laterality Date    CARPAL TUNNEL RELEASE Bilateral     EYE SURGERY      KNEE SURGERY Left     SINUS SURGERY      SKIN CANCER EXCISION      basal cell removal under left eye       Current Outpatient Medications:     lisinopril (PRINIVIL;ZESTRIL) 10 MG tablet, Take 1 tablet by mouth daily, Disp: 30 tablet, Rfl: 3    fluticasone (FLONASE) 50 MCG/ACT nasal spray, , Disp: , Rfl:     Bernard Yancey ELLIPTA 100-62.5-25 MCG/INH AEPB, INHALE 1 PUFF INTO THE LUNGS DAILY, Disp: 120 each, Rfl: 1    melatonin 3 MG TABS tablet, Take 3 mg by mouth nightly as needed, Disp: , Rfl:     albuterol sulfate HFA (PROAIR HFA) 108 (90 Base) MCG/ACT inhaler, Inhale 2 puffs into the lungs every 6 hours as needed for Wheezing, Disp: 1 each, Rfl: 5  Allergies   Allergen Reactions    Amoxicillin Other (See Comments)    Ampicillin Other (See Comments)    Doxycycline     Lidocaine Other (See Comments)     Review of Systems:  Review of Systems   Constitutional:  Positive for unexpected weight change (loss). Negative for chills, diaphoresis and fever. HENT:  Negative for congestion and rhinorrhea. Eyes:  Negative for visual disturbance. Respiratory:  Negative for cough and shortness of breath. Cardiovascular:  Negative for chest pain. Gastrointestinal:  Negative for blood in stool, diarrhea, nausea and vomiting. Early satiety   Genitourinary:  Negative for difficulty urinating and hematuria. Skin:  Negative for rash. Neurological:  Negative for dizziness and headaches. Psychiatric/Behavioral:  Negative for dysphoric mood. Objective:    Physical Exam:  Vitals:    01/13/23 1316 01/13/23 1349   BP: (!) 130/58 136/62   Site: Left Upper Arm Right Upper Arm   Position: Sitting Sitting   Cuff Size: Child Small Adult   Pulse: 85    Resp: 15    Temp: 98.2 °F (36.8 °C)    TempSrc: Oral    SpO2: 97%    Weight: 99 lb 9.6 oz (45.2 kg)    Height: 5' 2\" (1.575 m)      Physical Exam  Constitutional:       General: She is not in acute distress. Appearance: Normal appearance. She is not ill-appearing, toxic-appearing or diaphoretic. HENT:      Head: Normocephalic and atraumatic. Right Ear: External ear normal.      Left Ear: External ear normal.   Eyes:      General: No scleral icterus. Cardiovascular:      Rate and Rhythm: Normal rate and regular rhythm. Pulses: Normal pulses.       Heart sounds: Normal heart sounds. No murmur heard. No friction rub. No gallop. Pulmonary:      Effort: Pulmonary effort is normal. No respiratory distress. Breath sounds: Normal breath sounds. No stridor. No wheezing, rhonchi or rales. Chest:      Chest wall: No tenderness. Abdominal:      Palpations: Abdomen is soft. Skin:     General: Skin is warm and dry. Capillary Refill: Capillary refill takes less than 2 seconds. Neurological:      Mental Status: She is alert. Psychiatric:         Mood and Affect: Mood normal.         Behavior: Behavior normal.     Body mass index is 18.22 kg/m². Labs:  No results found for this or any previous visit (from the past 672 hour(s)). Imaging:  No orders to display         ASSESSMENT & PLAN:    Shaggy Koch is a 81y.o. year old female here for Hypertension  . The following is a summary of the plan made at this visit:    1. Essential hypertension  Assessment & Plan:  Well controlled now. Asymptomatic. Will see in 6 months for f/u. Call back/ED precautions discussed. Medication refilled today. Blood Pressure Goal:  [ ] < 130/80 (ACC/AHA)  [ ] < 140/90 (JNC-8 for age < 61, or CKD, or DM)  [x] < 150/90 (JNC-8 for age > 61) - frail, age and risk for falls      Orders:  -     lisinopril (PRINIVIL;ZESTRIL) 10 MG tablet; Take 1 tablet by mouth daily, Disp-30 tablet, R-3Normal  2. Simple chronic bronchitis ([facility] [address]  Assessment & Plan:   Well controlled. No recent H/I/E. On albuterol and Trelegy - to continue. UTD on immunizations. Call back/ED precautions discussed.

## 2023-01-19 PROBLEM — Z00.00 HEALTHCARE MAINTENANCE: Status: RESOLVED | Noted: 2022-12-20 | Resolved: 2023-01-19

## 2023-01-19 PROBLEM — Z11.59 ENCOUNTER FOR HEPATITIS C SCREENING TEST FOR LOW RISK PATIENT: Status: RESOLVED | Noted: 2022-12-20 | Resolved: 2023-01-19

## 2023-01-28 RX ORDER — FLUTICASONE PROPIONATE 50 MCG
SPRAY, SUSPENSION (ML) NASAL
Qty: 16 G | OUTPATIENT
Start: 2023-01-28

## 2023-02-17 ENCOUNTER — TELEPHONE (OUTPATIENT)
Dept: PRIMARY CARE CLINIC | Age: 73
End: 2023-02-17

## 2023-02-17 NOTE — TELEPHONE ENCOUNTER
Called patient to see if she was able to f/u with GI. No answer received, message left asking for call back.

## 2023-02-21 ENCOUNTER — TELEPHONE (OUTPATIENT)
Dept: PRIMARY CARE CLINIC | Age: 73
End: 2023-02-21

## 2023-02-21 NOTE — TELEPHONE ENCOUNTER
Called the patient to remind her to schedule with GI and her mammogram. Called patient about this result, no answer received, message left asking for call back. Spoke with her son and asked him to ask her to call us back as well.

## 2023-02-24 ENCOUNTER — TELEPHONE (OUTPATIENT)
Dept: PRIMARY CARE CLINIC | Age: 73
End: 2023-02-24

## 2023-02-24 NOTE — TELEPHONE ENCOUNTER
Called patient about this, no answer received, message left asking for call back. ----- Message from Juliann Severin, MD sent at 2/17/2023 10:21 AM EST -----  Ensure mammo is scheduled and she scheduled with GI, if not please call to remind her. Thank you!

## 2023-03-15 ENCOUNTER — TELEPHONE (OUTPATIENT)
Dept: PULMONOLOGY | Age: 73
End: 2023-03-15

## 2023-03-15 DIAGNOSIS — J41.0 SIMPLE CHRONIC BRONCHITIS (HCC): Primary | ICD-10-CM

## 2023-03-15 RX ORDER — FLUTICASONE FUROATE, UMECLIDINIUM BROMIDE AND VILANTEROL TRIFENATATE 100; 62.5; 25 UG/1; UG/1; UG/1
1 POWDER RESPIRATORY (INHALATION) DAILY
Qty: 1 EACH | Refills: 5 | Status: SHIPPED | OUTPATIENT
Start: 2023-03-15

## 2023-03-15 NOTE — TELEPHONE ENCOUNTER
Refill request for Len Ross 100-62.5-25 MCG/INH AEPB [[id number]]   medication. Name of [address], [address] [place] [address] [address] Carly Diamond [phone]       Last visit - 9.8.22     Pending visit - 5.30.23        Additional Comments Dr. Elie Raza originally prescribed this medication for patient. She is asking if Dr. Marc Valladares will send in refill for her. Please call patient and advise.

## 2023-03-24 ENCOUNTER — TELEPHONE (OUTPATIENT)
Dept: PRIMARY CARE CLINIC | Age: 73
End: 2023-03-24

## 2023-03-24 NOTE — TELEPHONE ENCOUNTER
Called patient about the reminder below, no answer received, message left asking for call back. ----- Message from Castro Mackey MD sent at 2/17/2023 10:21 AM EST -----  Ensure mammo is scheduled and if she scheduled with GI for colonoscopy, if not please call to remind her. Thank you!

## 2023-04-07 ENCOUNTER — TELEPHONE (OUTPATIENT)
Dept: PRIMARY CARE CLINIC | Age: 73
End: 2023-04-07

## 2023-04-07 NOTE — TELEPHONE ENCOUNTER
Called patient about the reminder below and to remind her to schedule with GI as well, no answer received, message left asking for call back. ----- Message from Kun Kessler MD sent at 2/17/2023 10:21 AM EST -----  Ensure mammo is scheduled, if not please call to remind her. Thank you!

## 2023-05-02 ENCOUNTER — TELEPHONE (OUTPATIENT)
Dept: PRIMARY CARE CLINIC | Age: 73
End: 2023-05-02

## 2023-05-02 NOTE — TELEPHONE ENCOUNTER
Received call from the patient, neighbor fell and she tried to help him - called CPR but he . Wanted to know if I could call in Xanax for her. I advised that I did not feel comfortable doing so at this time but offered [facility] to try to get established - she declined at this time and stated she would call me back if sx worsened. Would go to [employer] and lean on her son for support. Denied SI/HI. Call back/ED precautions discussed.

## 2023-05-11 ENCOUNTER — OFFICE VISIT (OUTPATIENT)
Dept: PRIMARY CARE CLINIC | Age: 73
End: 2023-05-11

## 2023-05-11 VITALS
SYSTOLIC BLOOD PRESSURE: 122 MMHG | BODY MASS INDEX: 17.63 KG/M2 | RESPIRATION RATE: 16 BRPM | TEMPERATURE: 98 F | OXYGEN SATURATION: 97 % | HEART RATE: 94 BPM | WEIGHT: 95.8 LBS | DIASTOLIC BLOOD PRESSURE: 68 MMHG | HEIGHT: 62 IN

## 2023-05-11 DIAGNOSIS — J43.2 CENTRILOBULAR EMPHYSEMA (HCC): ICD-10-CM

## 2023-05-11 DIAGNOSIS — J41.0 SIMPLE CHRONIC BRONCHITIS (HCC): ICD-10-CM

## 2023-05-11 DIAGNOSIS — M54.50 ACUTE BILATERAL LOW BACK PAIN WITHOUT SCIATICA: Primary | ICD-10-CM

## 2023-05-11 DIAGNOSIS — I10 ESSENTIAL HYPERTENSION: ICD-10-CM

## 2023-05-11 DIAGNOSIS — Z74.09 IMPAIRED MOBILITY: ICD-10-CM

## 2023-05-11 PROBLEM — R00.2 PALPITATIONS: Status: RESOLVED | Noted: 2022-09-23 | Resolved: 2023-05-11

## 2023-05-11 RX ORDER — FLUTICASONE FUROATE, UMECLIDINIUM BROMIDE AND VILANTEROL TRIFENATATE 100; 62.5; 25 UG/1; UG/1; UG/1
1 POWDER RESPIRATORY (INHALATION) DAILY
Qty: 28 EACH | Refills: 0 | Status: SHIPPED | OUTPATIENT
Start: 2023-05-11

## 2023-05-11 RX ORDER — LISINOPRIL 10 MG/1
10 TABLET ORAL DAILY
Qty: 90 TABLET | Refills: 0 | Status: SHIPPED | OUTPATIENT
Start: 2023-05-11 | End: 2023-08-09

## 2023-05-11 SDOH — ECONOMIC STABILITY: FOOD INSECURITY: WITHIN THE PAST 12 MONTHS, YOU WORRIED THAT YOUR FOOD WOULD RUN OUT BEFORE YOU GOT MONEY TO BUY MORE.: NEVER TRUE

## 2023-05-11 SDOH — ECONOMIC STABILITY: INCOME INSECURITY: HOW HARD IS IT FOR YOU TO PAY FOR THE VERY BASICS LIKE FOOD, HOUSING, MEDICAL CARE, AND HEATING?: NOT HARD AT ALL

## 2023-05-11 SDOH — ECONOMIC STABILITY: FOOD INSECURITY: WITHIN THE PAST 12 MONTHS, THE FOOD YOU BOUGHT JUST DIDN'T LAST AND YOU DIDN'T HAVE MONEY TO GET MORE.: NEVER TRUE

## 2023-05-11 ASSESSMENT — PATIENT HEALTH QUESTIONNAIRE - PHQ9
SUM OF ALL RESPONSES TO PHQ QUESTIONS 1-9: 0
1. LITTLE INTEREST OR PLEASURE IN DOING THINGS: 0
SUM OF ALL RESPONSES TO PHQ9 QUESTIONS 1 & 2: 0
SUM OF ALL RESPONSES TO PHQ QUESTIONS 1-9: 0
2. FEELING DOWN, DEPRESSED OR HOPELESS: 0
SUM OF ALL RESPONSES TO PHQ QUESTIONS 1-9: 0
SUM OF ALL RESPONSES TO PHQ QUESTIONS 1-9: 0

## 2023-05-11 ASSESSMENT — ENCOUNTER SYMPTOMS
BACK PAIN: 1
COUGH: 0
NAUSEA: 0
COLOR CHANGE: 0
DIARRHEA: 0
ABDOMINAL PAIN: 0
SHORTNESS OF BREATH: 0
RHINORRHEA: 0
BLOOD IN STOOL: 0
VOMITING: 0

## 2023-05-11 NOTE — ASSESSMENT & PLAN NOTE
Well controlled and pulm provided Renny, she is out of this and would like refill until she is able to see pulm at the end of the month. Refill provided.

## 2023-05-11 NOTE — PATIENT INSTRUCTIONS
Please find our [facility] below for your convenience! CENTRAL LOCATIONS    1) [address], [address]  Phone: [phone]    2) [address]  [facility], [address]  Phone: Mark Oral    3) [address] [place], [address]  [address]  Phone: [phone]    0) [facility], [address]  Phone: [phone]    5) [facility] [address]  [address]  Phone: [address]    6) [address] [facility], [address] [facility], [address]  Phone: [phone]    7) [address] [facility] [address]  Phone: [phone]) [facility] [address]  [place], [address]  Phone: [phone]) [address] [place] [address] [address], [address]. [place] [address] [facility]  Phone: Therese    10) [facility]  [address] [facility], [address] [facility]  Phone: [phone]) [address]  3/3[birth year][address], [address] [facility] [address]  Phone: [phone] [facility]    12) [address] [facility] [address] [place] [address], [address] [facility], [address] [facility]  Phone: [phone]    13) [facility]   [facility], [address] [place] [address]  Phone: [phone]

## 2023-05-11 NOTE — ASSESSMENT & PLAN NOTE
Well controlled now. Asymptomatic. Call back/ED precautions discussed. Medication refilled today.     Blood Pressure Goal:  [ ] < 130/80 (ACC/AHA)  [ ] < 140/90 (JNC-8 for age < 61, or CKD, or DM)  [x] < 150/90 (JNC-8 for age > 61) - frail, age and risk for falls

## 2023-05-11 NOTE — ASSESSMENT & PLAN NOTE
Poorly controlled and likely acute sprain/strain on chronic back pain. No alarm symptoms on exam or HPI. She is frail and thin and fall/fracture risk is very high. Declined DEXA and reports no osteoporosis tx desired at this time. Requested ortho referral - provided as requested. Hx of unexplained weight loss, advised to schedule with her GI as well and overdue on colonoscopy - she stated she would but schedule is busy with caring for mom. Scheduled today for her overdue mammo and encouraged to get outstanding labs previously ordered done. Fall/call back/ED precautions discussed. DME order provided for walker today.

## 2023-05-11 NOTE — PROGRESS NOTES
Lolly Donovan is a 81y.o. year old female here for:    Chief Complaint:    Chief Complaint   Patient presents with    Back Pain     Subjective: Today, her current concerns include:    HPI:  #Back Pain  - Onset: about 10 days ago or so  - Context: She had a neighbor who fell and she tried to help him - tried CPR but he . Has had back pain since then. Denied recent falls. Reports she has osteoporosis, no DEXA on file, she reports done but unsure where, repeat order placed but she stated that at this age she does not see much benefit and would not elect to treat. Daily smoker - cessation encouraged. Hx of unexplained weight loss, advised to schedule with her GI as well and overdue on colonoscopy - she stated she would but schedule is busy with caring for mom. Scheduled today for her overdue mammo and encouraged to get outstanding labs previously ordered done. - Location: Lower  - Quality: Throbbing  - Radiation: None  - Severity: 8/10 at worst, currently 6/10  - Timing/Duration: Constant and daily  - Inciting Event: As above  - Progression: Worsening  - Modifiers: Worse when she has to get up and move. Aspirin did help. - Associated Symptoms: Per ROS as otherwise stated in this note. Denied retention of bladder or incontinence or bowel.     Past Medical History:   Diagnosis Date    Anxiety     Asthma     COPD (chronic obstructive pulmonary disease) ([facility] [address]     Emphysema of lung ([facility] [address]     Hypertension     Palpitations 2022     Social History     Tobacco Use    Smoking status: Every Day     Packs/day: 0.65     Years: 42.00     Pack years: 27.30     Types: Cigarettes     Start date: 1973    Smokeless tobacco: Never    Tobacco comments:     22 3-4 cigs a day, per smoking hx audit, pt smoked 40 years in 2020, at heaviest pt smoked 1 ppd, current 0.25 ppd, average 0.65   Vaping Use    Vaping Use: Never used   Substance Use Topics    Alcohol use: No    Drug use: No     Family History   Problem

## 2023-05-11 NOTE — ASSESSMENT & PLAN NOTE
Given back pain and with known frailty, requested walker to avoid falls, DME order provided and provided location she can fill this ([facility]).

## 2023-05-30 ENCOUNTER — OFFICE VISIT (OUTPATIENT)
Dept: PULMONOLOGY | Age: 73
End: 2023-05-30
Payer: MEDICARE

## 2023-05-30 VITALS
HEART RATE: 102 BPM | TEMPERATURE: 97.7 F | HEIGHT: 62 IN | RESPIRATION RATE: 18 BRPM | SYSTOLIC BLOOD PRESSURE: 153 MMHG | BODY MASS INDEX: 17.52 KG/M2 | OXYGEN SATURATION: 93 % | DIASTOLIC BLOOD PRESSURE: 73 MMHG | WEIGHT: 95.2 LBS

## 2023-05-30 DIAGNOSIS — Z87.891 PERSONAL HISTORY OF TOBACCO USE: ICD-10-CM

## 2023-05-30 DIAGNOSIS — F17.200 TOBACCO USE DISORDER: Primary | ICD-10-CM

## 2023-05-30 DIAGNOSIS — J43.2 CENTRILOBULAR EMPHYSEMA (HCC): ICD-10-CM

## 2023-05-30 DIAGNOSIS — J41.0 SIMPLE CHRONIC BRONCHITIS (HCC): ICD-10-CM

## 2023-05-30 DIAGNOSIS — F41.9 ANXIETY: ICD-10-CM

## 2023-05-30 DIAGNOSIS — J30.9 CHRONIC ALLERGIC RHINITIS: ICD-10-CM

## 2023-05-30 PROCEDURE — 99214 OFFICE O/P EST MOD 30 MIN: CPT | Performed by: INTERNAL MEDICINE

## 2023-05-30 PROCEDURE — 3077F SYST BP >= 140 MM HG: CPT | Performed by: INTERNAL MEDICINE

## 2023-05-30 PROCEDURE — G8419 CALC BMI OUT NRM PARAM NOF/U: HCPCS | Performed by: INTERNAL MEDICINE

## 2023-05-30 PROCEDURE — G8400 PT W/DXA NO RESULTS DOC: HCPCS | Performed by: INTERNAL MEDICINE

## 2023-05-30 PROCEDURE — 1090F PRES/ABSN URINE INCON ASSESS: CPT | Performed by: INTERNAL MEDICINE

## 2023-05-30 PROCEDURE — 1123F ACP DISCUSS/DSCN MKR DOCD: CPT | Performed by: INTERNAL MEDICINE

## 2023-05-30 PROCEDURE — G0296 VISIT TO DETERM LDCT ELIG: HCPCS | Performed by: INTERNAL MEDICINE

## 2023-05-30 PROCEDURE — 4004F PT TOBACCO SCREEN RCVD TLK: CPT | Performed by: INTERNAL MEDICINE

## 2023-05-30 PROCEDURE — 3078F DIAST BP <80 MM HG: CPT | Performed by: INTERNAL MEDICINE

## 2023-05-30 PROCEDURE — 3017F COLORECTAL CA SCREEN DOC REV: CPT | Performed by: INTERNAL MEDICINE

## 2023-05-30 PROCEDURE — 3023F SPIROM DOC REV: CPT | Performed by: INTERNAL MEDICINE

## 2023-05-30 PROCEDURE — G8427 DOCREV CUR MEDS BY ELIG CLIN: HCPCS | Performed by: INTERNAL MEDICINE

## 2023-05-30 RX ORDER — FLUTICASONE FUROATE, UMECLIDINIUM BROMIDE AND VILANTEROL TRIFENATATE 100; 62.5; 25 UG/1; UG/1; UG/1
1 POWDER RESPIRATORY (INHALATION) DAILY
Qty: 28 EACH | Refills: 5 | Status: SHIPPED | OUTPATIENT
Start: 2023-05-30

## 2023-05-30 RX ORDER — ALBUTEROL SULFATE 90 UG/1
2 AEROSOL, METERED RESPIRATORY (INHALATION) EVERY 6 HOURS PRN
Qty: 1 EACH | Refills: 5 | Status: SHIPPED | OUTPATIENT
Start: 2023-05-30

## 2023-05-30 NOTE — PATIENT INSTRUCTIONS
30-pack-year history. To see if you could benefit from screening, first find out if you are at high risk for lung cancer. Your doctor can help you decide your lung cancer risk. What are the risks of screening? CT screening for lung cancer isn't perfect. It can show an abnormal result when it turns out there wasn't any cancer. This is called a false-positive result. This means you may need more tests to make sure you don't have cancer. These tests can be harmful and cause a lot of worry. These tests may include more CT scans and invasive testing like a lung biopsy. In a biopsy, the doctor takes a sample of tissue from inside your lung so it can be looked at under a microscope. A biopsy is the only way to tell if you have lung cancer. If the biopsy finds cancer, you and your doctor will have to decide how or whether to treat it. Some lung cancers found on CT scans are harmless and would not have caused a problem if they had not been found through screening. But because doctors can't tell which ones will turn out to be harmless, most will be treated. This means that you may get treatment--including surgery, radiation, or chemotherapy--that you don't need. There is a risk of damage to cells or tissue from being exposed to radiation, including the small amounts used in CTs, X-rays, and other medical tests. Over time, exposure to radiation may cause cancer and other health problems. But in most cases, the risk of getting cancer from being exposed to small amounts of radiation is low. It's not a reason to avoid these tests for most people. What are the benefits of screening? Your scan may be normal (negative). For some people who are at higher risk, screening lowers the chance of dying of lung cancer. How much and how long you smoked helps to determine your risk level. Screening can find some cancers early, when treatment may be more likely to work. What happens after screening?   The results of your CT scan will

## 2023-05-30 NOTE — PROGRESS NOTES
Chief Complaint/Referring Provider:  Pulmonary follow up to discuss clinical status and test results    Patient has come to the office for a pulmonary follow-up, patient states that she does have some postnasal drainage along with that patient states that her shortness of breath is stable, patient states that she had run out of the prescription for Trelegy Ellipta, patient also states that she does have occasional wheezing, patient on questioning further states that she is having some increased dizziness and patient states that her mother keeps the house at [de-identified] °F in terms of heating which gets to her, patient also has some dryness of the mouth, patient also states that on Friday night she was about to take the dog out and patient states that she felt so hot and the next thing she remembers is that she was soaking wet and patient states that she was lying on the ground, patient had some right hip pain and discomfort along with some hip pain but patient did not seek any interventions, patient continues to be smoking, patient also continues to have some anxiety, patient does not have any confusion lethargy, no other pertinent review of system of concern    Previous  HPIPatient is a 72-year-old female who has come to the office for a pulmonary follow-up patient states that walking to the bathroom causes her to have palpitations and also feels that the heart is pounding, patient did not notice it with it is regular or irregular, patient states that she used to take Xanax on a regular basis and patient thinks that it is because of panic attack, patient also has been having some issues with the left arm and patient states that she cannot do overhead abduction and patient has not been evaluated for that, patient states that she is in the process of getting a new PCP and patient supposed to see the new PCP tomorrow morning, patient states that she does have coughing with clear secretions, patient does have some wheezing, and Fall risk

## 2023-06-08 ENCOUNTER — TELEPHONE (OUTPATIENT)
Dept: PRIMARY CARE CLINIC | Age: 73
End: 2023-06-08

## 2023-06-08 NOTE — TELEPHONE ENCOUNTER
Called and spoke with pt about missed mammo appt. Pt sts decided to have mammo completed at the facility she used before. Pt declined assistance in scheduling this appt stating she is not able to schedule at this time due to transportation issues.

## 2023-07-14 ENCOUNTER — OFFICE VISIT (OUTPATIENT)
Dept: PRIMARY CARE CLINIC | Age: 73
End: 2023-07-14

## 2023-07-14 VITALS
OXYGEN SATURATION: 95 % | BODY MASS INDEX: 16.86 KG/M2 | WEIGHT: 91.6 LBS | TEMPERATURE: 98.5 F | HEIGHT: 62 IN | DIASTOLIC BLOOD PRESSURE: 60 MMHG | SYSTOLIC BLOOD PRESSURE: 114 MMHG | HEART RATE: 76 BPM

## 2023-07-14 DIAGNOSIS — Z12.11 SPECIAL SCREENING FOR MALIGNANT NEOPLASM OF COLON: ICD-10-CM

## 2023-07-14 DIAGNOSIS — I10 ESSENTIAL HYPERTENSION: ICD-10-CM

## 2023-07-14 DIAGNOSIS — I10 ESSENTIAL HYPERTENSION: Primary | ICD-10-CM

## 2023-07-14 RX ORDER — LISINOPRIL 5 MG/1
5 TABLET ORAL DAILY
Qty: 90 TABLET | OUTPATIENT
Start: 2023-07-14

## 2023-07-14 RX ORDER — LISINOPRIL 5 MG/1
5 TABLET ORAL DAILY
Qty: 30 TABLET | Refills: 0 | Status: SHIPPED | OUTPATIENT
Start: 2023-07-14

## 2023-07-14 RX ORDER — LISINOPRIL 5 MG/1
5 TABLET ORAL DAILY
COMMUNITY
End: 2023-07-14 | Stop reason: SDUPTHER

## 2023-07-14 SDOH — ECONOMIC STABILITY: FOOD INSECURITY: WITHIN THE PAST 12 MONTHS, THE FOOD YOU BOUGHT JUST DIDN'T LAST AND YOU DIDN'T HAVE MONEY TO GET MORE.: NEVER TRUE

## 2023-07-14 SDOH — ECONOMIC STABILITY: INCOME INSECURITY: HOW HARD IS IT FOR YOU TO PAY FOR THE VERY BASICS LIKE FOOD, HOUSING, MEDICAL CARE, AND HEATING?: NOT HARD AT ALL

## 2023-07-14 SDOH — ECONOMIC STABILITY: FOOD INSECURITY: WITHIN THE PAST 12 MONTHS, YOU WORRIED THAT YOUR FOOD WOULD RUN OUT BEFORE YOU GOT MONEY TO BUY MORE.: NEVER TRUE

## 2023-07-14 ASSESSMENT — ENCOUNTER SYMPTOMS
VOMITING: 0
NAUSEA: 0
COUGH: 0
RHINORRHEA: 0
ABDOMINAL PAIN: 0
SHORTNESS OF BREATH: 0
DIARRHEA: 0
BLOOD IN STOOL: 0
COLOR CHANGE: 0

## 2023-07-14 ASSESSMENT — PATIENT HEALTH QUESTIONNAIRE - PHQ9
SUM OF ALL RESPONSES TO PHQ QUESTIONS 1-9: 0
1. LITTLE INTEREST OR PLEASURE IN DOING THINGS: 0
SUM OF ALL RESPONSES TO PHQ QUESTIONS 1-9: 0
SUM OF ALL RESPONSES TO PHQ QUESTIONS 1-9: 0
2. FEELING DOWN, DEPRESSED OR HOPELESS: 0
SUM OF ALL RESPONSES TO PHQ9 QUESTIONS 1 & 2: 0
SUM OF ALL RESPONSES TO PHQ QUESTIONS 1-9: 0

## 2023-07-14 NOTE — ASSESSMENT & PLAN NOTE
Poorly controlled in that it is still too aggressively controlled and increases risk for fall. Lisinopril decreased today to 5 mg QD. Scheduled for BP repeat in 3 weeks. Asymptomatic. Fall/call back/ED precautions discussed.     Blood Pressure Goal:  [ ] < 130/80 (ACC/AHA)  [x] < 140/90 (JNC-8 for age < 61, or CKD, or DM)  [ ] < 150/90 (JNC-8 for age > 61)

## 2023-07-14 NOTE — PROGRESS NOTES
Crispin Leo is a 72y.o. year old female here for:    Chief Complaint:    Chief Complaint   Patient presents with    Hypertension     Subjective: Today, her current concerns include:    HPI:  #HTN  - Onset: Years  - Quality: Asymptomatic  - Timing/Duration: Constant and daily  - Progression: Stable  - Modifiers:  On lisinopril 10 mg QD - tolerating well, reports adherence   - Associated Symptoms: Per ROS as otherwise stated in this note  - Previous occurrence:    Past Medical History:   Diagnosis Date    Anxiety     Asthma     COPD (chronic obstructive pulmonary disease) ([facility])     Emphysema of lung ([address])     Hypertension     Palpitations 9/23/2022     Social History     Tobacco Use    Smoking status: Every Day     Packs/day: 0.65     Years: 42.00     Pack years: 27.30     Types: Cigarettes     Start date: 5/27/1973    Smokeless tobacco: Never    Tobacco comments:     09/08/22 3-4 cigs a day, per smoking hx audit, pt smoked 40 years in 2020, at heaviest pt smoked 1 ppd, current 0.25 ppd, average 0.65   Vaping Use    Vaping Use: Never used   Substance Use Topics    Alcohol use: No    Drug use: No     Family History   Problem Relation Age of Onset    High Blood Pressure Mother     Asthma Mother     Cancer Father         renal cell carcinoma    Other Sister         fibromyalgia    Other Maternal Grandfather         black lung    Colon Cancer Neg Hx     Breast Cancer Neg Hx      Past Surgical History:   Procedure Laterality Date    CARPAL TUNNEL RELEASE Bilateral     EYE SURGERY      KNEE SURGERY Left     SINUS SURGERY      SKIN CANCER EXCISION      basal cell removal under left eye       Current Outpatient Medications:     lisinopril (PRINIVIL;ZESTRIL) 5 MG tablet, Take 1 tablet by mouth daily, Disp: 30 tablet, Rfl: 0    fluticasone-umeclidin-vilant (TRELEGY ELLIPTA) 100-62.5-25 MCG/ACT AEPB inhaler, Inhale 1 puff into the lungs daily, Disp: 28 each, Rfl: 5    albuterol sulfate HFA (PROAIR HFA) 108 (90 Base)

## 2023-08-04 ENCOUNTER — NURSE ONLY (OUTPATIENT)
Dept: PRIMARY CARE CLINIC | Age: 73
End: 2023-08-04

## 2023-08-04 VITALS — SYSTOLIC BLOOD PRESSURE: 128 MMHG | DIASTOLIC BLOOD PRESSURE: 60 MMHG

## 2023-08-04 DIAGNOSIS — I10 ESSENTIAL HYPERTENSION: Primary | ICD-10-CM

## 2023-08-04 DIAGNOSIS — I10 ESSENTIAL HYPERTENSION: ICD-10-CM

## 2023-08-04 RX ORDER — LISINOPRIL 5 MG/1
5 TABLET ORAL DAILY
Qty: 30 TABLET | Refills: 0 | Status: SHIPPED | OUTPATIENT
Start: 2023-08-04

## 2023-08-04 RX ORDER — LISINOPRIL 5 MG/1
5 TABLET ORAL DAILY
Qty: 30 TABLET | Refills: 0 | Status: CANCELLED | OUTPATIENT
Start: 2023-08-04

## 2023-08-04 NOTE — PROGRESS NOTES
Patient arrived for blood pressure check with reading of 128/60. Per Dr. Zoltan Henson last note their blood pressure goal is listed as noted below. Patient doing well today (as previously discussed with Dr. Libertad Peña insured that the patient had no headache, changes in vision, chest pain, SOB, or nausea).        Blood Pressure Goal: (add dots)   [ ] < 130/80 (ACC/AHA)   [ ] < 140/90 (JNC-8 for age < 61, or CKD, or DM)   [ ] < 150/90 (JNC-8 for age > 61)   [ ] Other:        -Please note per previous discuss with Dr. Libertad Peña advised that blood pressure goals were fine plus 6 of systolic and or diastolic blood pressures and second reading was done-

## 2023-08-04 NOTE — TELEPHONE ENCOUNTER
Pt. Requesting medication refill on Lisinopril 5 mg. States she has about one week of medication left.      Medication:   Requested Prescriptions     Pending Prescriptions Disp Refills    lisinopril (PRINIVIL;ZESTRIL) 5 MG tablet 30 tablet 0     Sig: Take 1 tablet by mouth daily     Last Filled: 7/14/2023     Last appt: 7/14/2023   Next appt: 12/21/2023

## 2023-08-04 NOTE — PROGRESS NOTES
Pt. Requesting medication refill on Lisinopril 5 mg. States she has about one week of medication left. Medication:   Requested Prescriptions     Pending Prescriptions Disp Refills    lisinopril (PRINIVIL;ZESTRIL) 5 MG tablet 30 tablet 0     Sig: Take 1 tablet by mouth daily     Last Filled: 7/14/2023     Last appt: 7/14/2023   Next appt: 12/21/2023    Last OARRS: No flowsheet data found.

## 2023-08-06 DIAGNOSIS — I10 ESSENTIAL HYPERTENSION: ICD-10-CM

## 2023-08-07 RX ORDER — LISINOPRIL 5 MG/1
5 TABLET ORAL DAILY
Qty: 90 TABLET | OUTPATIENT
Start: 2023-08-07

## 2023-08-08 ENCOUNTER — TELEPHONE (OUTPATIENT)
Dept: TELEMETRY | Age: 73
End: 2023-08-08

## 2023-08-08 NOTE — TELEPHONE ENCOUNTER
Patient due for annual CT Lung Screening. Reminder letter mailed. Order already placed. Central Scheduling number provided.     Valarie Carranza RN

## 2023-08-09 ENCOUNTER — TELEPHONE (OUTPATIENT)
Dept: PRIMARY CARE CLINIC | Age: 73
End: 2023-08-09

## 2023-08-09 NOTE — TELEPHONE ENCOUNTER
LMOM to see if pt was able to schedule her mammogram and to also schedule her to come into the office for a BP check.

## 2023-08-10 ENCOUNTER — TELEPHONE (OUTPATIENT)
Dept: PRIMARY CARE CLINIC | Age: 73
End: 2023-08-10

## 2023-08-13 PROBLEM — Z12.11 SPECIAL SCREENING FOR MALIGNANT NEOPLASM OF COLON: Status: RESOLVED | Noted: 2023-07-14 | Resolved: 2023-08-13

## 2023-08-16 ENCOUNTER — TELEPHONE (OUTPATIENT)
Dept: PRIMARY CARE CLINIC | Age: 73
End: 2023-08-16

## 2023-08-16 NOTE — TELEPHONE ENCOUNTER
Called patient about the reminder below and also to reschedule no show BP check and remind her to do her Cologuard, no answer received, message left asking for call back. ----- Message from Johanny Pascal MD sent at 2/17/2023 10:21 AM EST -----  Ensure mammo is scheduled, if not please call to remind her. Thank you!

## 2023-08-31 ENCOUNTER — TELEPHONE (OUTPATIENT)
Dept: PULMONOLOGY | Age: 73
End: 2023-08-31

## 2023-08-31 DIAGNOSIS — I10 ESSENTIAL HYPERTENSION: ICD-10-CM

## 2023-08-31 RX ORDER — FLUTICASONE PROPIONATE 50 MCG
2 SPRAY, SUSPENSION (ML) NASAL DAILY
Qty: 16 G | Refills: 2 | Status: SHIPPED | OUTPATIENT
Start: 2023-08-31

## 2023-09-01 RX ORDER — LISINOPRIL 5 MG/1
5 TABLET ORAL DAILY
Qty: 30 TABLET | Refills: 0 | OUTPATIENT
Start: 2023-09-01

## 2023-09-01 NOTE — TELEPHONE ENCOUNTER
Patient needs blood work we ordered done first. Also to reschedule her missed blood pressure nursing visit check. Routing to Yocasta to help me call the patient.

## 2023-09-07 NOTE — TELEPHONE ENCOUNTER
Pt returned call. Pt sts she plans to go get her blood work completed on 9/8/23. Pt also scheduled a nursing visit for a BP check. Pt is concerned for she is out of Lisinopril and is anxious about her BP without the med.

## 2023-09-08 DIAGNOSIS — I10 ESSENTIAL HYPERTENSION: ICD-10-CM

## 2023-09-08 DIAGNOSIS — Z11.59 ENCOUNTER FOR HEPATITIS C SCREENING TEST FOR LOW RISK PATIENT: ICD-10-CM

## 2023-09-08 DIAGNOSIS — R73.9 ELEVATED BLOOD SUGAR: ICD-10-CM

## 2023-09-08 DIAGNOSIS — E78.00 ELEVATED LDL CHOLESTEROL LEVEL: ICD-10-CM

## 2023-09-08 DIAGNOSIS — M25.50 ARTHRALGIA, UNSPECIFIED JOINT: ICD-10-CM

## 2023-09-09 LAB
ANA SER QL IA: NEGATIVE
ANION GAP SERPL CALCULATED.3IONS-SCNC: 13 MMOL/L (ref 3–16)
BUN SERPL-MCNC: 14 MG/DL (ref 7–20)
CALCIUM SERPL-MCNC: 9.3 MG/DL (ref 8.3–10.6)
CHLORIDE SERPL-SCNC: 97 MMOL/L (ref 99–110)
CHOLEST SERPL-MCNC: 199 MG/DL (ref 0–199)
CO2 SERPL-SCNC: 27 MMOL/L (ref 21–32)
CREAT SERPL-MCNC: 1 MG/DL (ref 0.6–1.2)
CRP SERPL-MCNC: 4.4 MG/L (ref 0–5.1)
ERYTHROCYTE [SEDIMENTATION RATE] IN BLOOD BY WESTERGREN METHOD: 20 MM/HR (ref 0–30)
EST. AVERAGE GLUCOSE BLD GHB EST-MCNC: 119.8 MG/DL
GFR SERPLBLD CREATININE-BSD FMLA CKD-EPI: >60 ML/MIN/{1.73_M2}
GLUCOSE SERPL-MCNC: 84 MG/DL (ref 70–99)
HBA1C MFR BLD: 5.8 %
HCV AB SERPL QL IA: REACTIVE
HDLC SERPL-MCNC: 48 MG/DL (ref 40–60)
LDLC SERPL CALC-MCNC: 130 MG/DL
POTASSIUM SERPL-SCNC: 4.4 MMOL/L (ref 3.5–5.1)
RHEUMATOID FACT SER IA-ACNC: <10 IU/ML
SODIUM SERPL-SCNC: 137 MMOL/L (ref 136–145)
TRIGL SERPL-MCNC: 106 MG/DL (ref 0–150)
VLDLC SERPL CALC-MCNC: 21 MG/DL

## 2023-09-12 ENCOUNTER — NURSE ONLY (OUTPATIENT)
Dept: PRIMARY CARE CLINIC | Age: 73
End: 2023-09-12

## 2023-09-12 VITALS — DIASTOLIC BLOOD PRESSURE: 60 MMHG | SYSTOLIC BLOOD PRESSURE: 126 MMHG

## 2023-09-12 DIAGNOSIS — B19.20 HEPATITIS C TEST POSITIVE: Primary | ICD-10-CM

## 2023-09-12 DIAGNOSIS — I10 ESSENTIAL HYPERTENSION: ICD-10-CM

## 2023-09-12 DIAGNOSIS — B19.20 HEPATITIS C TEST POSITIVE: ICD-10-CM

## 2023-09-12 RX ORDER — LISINOPRIL 5 MG/1
5 TABLET ORAL DAILY
Qty: 90 TABLET | Refills: 1 | Status: SHIPPED | OUTPATIENT
Start: 2023-09-12 | End: 2024-03-10

## 2023-09-14 LAB
HCV RNA SERPL NAA+PROBE-ACNC: NOT DETECTED IU/ML
HCV RNA SERPL NAA+PROBE-LOG IU: NOT DETECTED LOG IU/ML
HCV RNA SERPL QL NAA+PROBE: NOT DETECTED

## 2023-09-15 PROBLEM — H02.005 ENTROPION OF LEFT LOWER EYELID: Status: ACTIVE | Noted: 2021-12-22

## 2023-10-16 ENCOUNTER — TELEPHONE (OUTPATIENT)
Dept: TELEMETRY | Age: 73
End: 2023-10-16

## 2023-10-16 NOTE — TELEPHONE ENCOUNTER
Patient due for annual CT Lung Screening. Reminder letter mailed. Order already placed. Central Scheduling number provided.     Sherri De RN

## 2023-11-21 ENCOUNTER — TELEPHONE (OUTPATIENT)
Dept: TELEMETRY | Age: 73
End: 2023-11-21

## 2023-11-21 NOTE — TELEPHONE ENCOUNTER
Patient due for annual CT Lung Screening. Reminder letter mailed. Scan already ordered. Central Scheduling number provided.     Joel Manuel RN

## 2023-12-27 DIAGNOSIS — J43.2 CENTRILOBULAR EMPHYSEMA (HCC): ICD-10-CM

## 2023-12-27 DIAGNOSIS — J41.0 SIMPLE CHRONIC BRONCHITIS (HCC): ICD-10-CM

## 2023-12-28 RX ORDER — FLUTICASONE FUROATE, UMECLIDINIUM BROMIDE AND VILANTEROL TRIFENATATE 100; 62.5; 25 UG/1; UG/1; UG/1
1 POWDER RESPIRATORY (INHALATION) DAILY
Qty: 60 EACH | Refills: 1 | Status: SHIPPED | OUTPATIENT
Start: 2023-12-28

## 2023-12-28 NOTE — TELEPHONE ENCOUNTER
Last office visit 5/30/2023     Last written 5/30/23     Next office visit scheduled 1/22/2024    Requested Prescriptions     Pending Prescriptions Disp [id number] [facility][note 2] 100-62.5-25 MCG/ACT AEPB inhaler [Pharmacy Med Name: Jad Espinosa 100-62. 5MCG INH 30P] 60 each      Sig: INHALE 1 PUFF INTO THE LUNGS DAILY

## 2024-01-20 PROBLEM — Z12.31 ENCOUNTER FOR SCREENING MAMMOGRAM FOR MALIGNANT NEOPLASM OF BREAST: Status: RESOLVED | Noted: 2022-09-09 | Resolved: 2024-01-20

## 2024-01-20 PROBLEM — Z00.00 HEALTHCARE MAINTENANCE: Status: RESOLVED | Noted: 2022-12-20 | Resolved: 2024-01-20

## 2024-01-22 ENCOUNTER — TELEPHONE (OUTPATIENT)
Dept: PULMONOLOGY | Age: 74
End: 2024-01-22

## 2024-01-22 NOTE — TELEPHONE ENCOUNTER
Patient did not show for 6 mo OV FU  with Dr Hand on 1/22/24.    Reason: unknown    This is patient's second no show.  Patient was also a no show on: 10/6/22.      Patient did not reschedule.  Reschedule date:  n/a     LVMTCB and reschedule.

## 2024-02-06 ENCOUNTER — TELEPHONE (OUTPATIENT)
Dept: PULMONOLOGY | Age: 74
End: 2024-02-06

## 2024-02-06 NOTE — TELEPHONE ENCOUNTER
Call patient left  to return call.  Patient s/c on 2/13, per last OV patient need to have a LDCT before her up coming appt on 2/13

## 2024-02-12 ENCOUNTER — OFFICE VISIT (OUTPATIENT)
Dept: PRIMARY CARE CLINIC | Age: 74
End: 2024-02-12
Payer: MEDICARE

## 2024-02-12 VITALS
RESPIRATION RATE: 19 BRPM | TEMPERATURE: 98.6 F | WEIGHT: 93 LBS | DIASTOLIC BLOOD PRESSURE: 62 MMHG | HEIGHT: 62 IN | BODY MASS INDEX: 17.11 KG/M2 | HEART RATE: 97 BPM | SYSTOLIC BLOOD PRESSURE: 122 MMHG | OXYGEN SATURATION: 97 %

## 2024-02-12 DIAGNOSIS — J43.2 CENTRILOBULAR EMPHYSEMA (HCC): ICD-10-CM

## 2024-02-12 DIAGNOSIS — I10 ESSENTIAL HYPERTENSION: Primary | ICD-10-CM

## 2024-02-12 DIAGNOSIS — Z74.09 IMPAIRED MOBILITY: ICD-10-CM

## 2024-02-12 DIAGNOSIS — J44.1 CHRONIC OBSTRUCTIVE PULMONARY DISEASE WITH ACUTE EXACERBATION (HCC): ICD-10-CM

## 2024-02-12 DIAGNOSIS — J41.0 SIMPLE CHRONIC BRONCHITIS (HCC): ICD-10-CM

## 2024-02-12 PROCEDURE — G8400 PT W/DXA NO RESULTS DOC: HCPCS | Performed by: FAMILY MEDICINE

## 2024-02-12 PROCEDURE — 1090F PRES/ABSN URINE INCON ASSESS: CPT | Performed by: FAMILY MEDICINE

## 2024-02-12 PROCEDURE — 3017F COLORECTAL CA SCREEN DOC REV: CPT | Performed by: FAMILY MEDICINE

## 2024-02-12 PROCEDURE — 4004F PT TOBACCO SCREEN RCVD TLK: CPT | Performed by: FAMILY MEDICINE

## 2024-02-12 PROCEDURE — G8419 CALC BMI OUT NRM PARAM NOF/U: HCPCS | Performed by: FAMILY MEDICINE

## 2024-02-12 PROCEDURE — G8427 DOCREV CUR MEDS BY ELIG CLIN: HCPCS | Performed by: FAMILY MEDICINE

## 2024-02-12 PROCEDURE — 3023F SPIROM DOC REV: CPT | Performed by: FAMILY MEDICINE

## 2024-02-12 PROCEDURE — G8484 FLU IMMUNIZE NO ADMIN: HCPCS | Performed by: FAMILY MEDICINE

## 2024-02-12 PROCEDURE — 1123F ACP DISCUSS/DSCN MKR DOCD: CPT | Performed by: FAMILY MEDICINE

## 2024-02-12 PROCEDURE — 3078F DIAST BP <80 MM HG: CPT | Performed by: FAMILY MEDICINE

## 2024-02-12 PROCEDURE — 99213 OFFICE O/P EST LOW 20 MIN: CPT | Performed by: FAMILY MEDICINE

## 2024-02-12 PROCEDURE — 3074F SYST BP LT 130 MM HG: CPT | Performed by: FAMILY MEDICINE

## 2024-02-12 RX ORDER — LISINOPRIL 2.5 MG/1
2.5 TABLET ORAL DAILY
Qty: 90 TABLET | Refills: 0 | Status: SHIPPED | OUTPATIENT
Start: 2024-02-12 | End: 2024-05-12

## 2024-02-12 NOTE — ASSESSMENT & PLAN NOTE
Well controlled. Asymptomatic. Labs ordered and medication refilled. Fall/call back/ED precautions discussed.    Blood Pressure Goal:  [ ] < 130/80 (ACC/AHA)  [x] < 140/90 (JNC-8 for age < 60, or CKD, or DM)  [ ] < 150/90 (JNC-8 for age > 60)

## 2024-02-12 NOTE — PROGRESS NOTES
gallop.   Pulmonary:      Effort: Pulmonary effort is normal. No respiratory distress.      Breath sounds: Normal breath sounds. No stridor. No wheezing, rhonchi or rales.   Chest:      Chest wall: No tenderness.   Abdominal:      Palpations: Abdomen is soft.   Skin:     General: Skin is warm and dry.      Capillary Refill: Capillary refill takes less than 2 seconds.   Neurological:      Mental Status: She is alert.   Psychiatric:         Mood and Affect: Mood normal.         Behavior: Behavior normal.       Body mass index is 17.01 kg/m².    Labs:  No results found for this or any previous visit (from the past 672 hour(s)).    Imaging:  No orders to display       ASSESSMENT & PLAN:    Anna Phelan is a 73 y.o. year old female here for Hypertension. The following is a summary of the plan made at this visit:    1. Essential hypertension  Assessment & Plan:  Well controlled. Asymptomatic. Labs ordered and medication refilled. Fall/call back/ED precautions discussed.    Blood Pressure Goal:  [ ] < 130/80 (ACC/AHA)  [x] < 140/90 (JNC-8 for age < 60, or CKD, or DM)  [ ] < 150/90 (JNC-8 for age > 60)        Orders:  -     lisinopril (PRINIVIL;ZESTRIL) 2.5 MG tablet; Take 1 tablet by mouth daily, Disp-90 tablet, R-0Normal  -     Basic Metabolic Panel; Future  2. Simple chronic bronchitis (HCC)  Assessment & Plan:  Well controlled. Defer to pulm on this with whom she follows tomorrow.  3. Centrilobular emphysema (HCC)  Assessment & Plan:  Well controlled see notes under simple chronic bronchitis for more please.  4. Chronic obstructive pulmonary disease with acute exacerbation (HCC)  Assessment & Plan:   Well controlled see notes under simple chronic bronchitis for more please.  5. Impaired mobility  Assessment & Plan:  Encouraged to schedule DEXA. Fall precautions discussed.

## 2024-02-12 NOTE — PATIENT INSTRUCTIONS
[address]  [place] [address]  Phone: [phone]    13) [facility]  [address] [place] [address]  Phone: [phone]

## 2024-02-13 ENCOUNTER — OFFICE VISIT (OUTPATIENT)
Dept: PULMONOLOGY | Age: 74
End: 2024-02-13

## 2024-02-13 VITALS
TEMPERATURE: 98 F | BODY MASS INDEX: 17.23 KG/M2 | OXYGEN SATURATION: 97 % | DIASTOLIC BLOOD PRESSURE: 71 MMHG | RESPIRATION RATE: 16 BRPM | SYSTOLIC BLOOD PRESSURE: 127 MMHG | HEART RATE: 97 BPM | HEIGHT: 62 IN | WEIGHT: 93.6 LBS

## 2024-02-13 DIAGNOSIS — J44.9 PULMONARY CACHEXIA DUE TO CHRONIC OBSTRUCTIVE PULMONARY DISEASE (HCC): ICD-10-CM

## 2024-02-13 DIAGNOSIS — Z87.891 PERSONAL HISTORY OF TOBACCO USE: ICD-10-CM

## 2024-02-13 DIAGNOSIS — F17.200 TOBACCO USE DISORDER: ICD-10-CM

## 2024-02-13 DIAGNOSIS — J41.0 SIMPLE CHRONIC BRONCHITIS (HCC): Primary | ICD-10-CM

## 2024-02-13 DIAGNOSIS — K21.9 GASTROESOPHAGEAL REFLUX DISEASE WITHOUT ESOPHAGITIS: ICD-10-CM

## 2024-02-13 DIAGNOSIS — J30.9 CHRONIC ALLERGIC RHINITIS: ICD-10-CM

## 2024-02-13 DIAGNOSIS — J43.2 CENTRILOBULAR EMPHYSEMA (HCC): ICD-10-CM

## 2024-02-13 DIAGNOSIS — R64 PULMONARY CACHEXIA DUE TO CHRONIC OBSTRUCTIVE PULMONARY DISEASE (HCC): ICD-10-CM

## 2024-02-13 RX ORDER — FLUTICASONE PROPIONATE 50 MCG
2 SPRAY, SUSPENSION (ML) NASAL DAILY
Qty: 16 G | Refills: 2 | Status: SHIPPED | OUTPATIENT
Start: 2024-02-13

## 2024-02-13 RX ORDER — ALBUTEROL SULFATE 90 UG/1
2 AEROSOL, METERED RESPIRATORY (INHALATION) EVERY 6 HOURS PRN
Qty: 1 EACH | Refills: 5 | Status: SHIPPED | OUTPATIENT
Start: 2024-02-13

## 2024-02-13 NOTE — PROGRESS NOTES
Chief Complaint/Referring Provider:  Pulmonary follow up to discuss clinical status and options    Patient is a 73-year-old female was come to the office for a pulmonary evaluation, patient states that she feels tired and having no energy, patient also states that when she does some activities like vacuuming and all she has tachycardia at that time, patient also has been having some postnasal drainage, patient's left ear has been having some discomfort with that, patient has phlegm which is clear in nature most of the time but has had some tinge of yellow, patient has significant reflux symptoms which are causing her to be symptomatic and patient states that her PCP does not want to give her PPI and wants her to see a GI, patient has been eating some Gummies with apple cider with transient improvement, patient also has minimal wheezing at times, patient has lost some weight, patient states that she continues to smoke but patient states that she smokes less, patient's diet is suboptimal, no other pertinent review of system of concern    Previous  HPIPatient has come to the office for a pulmonary follow-up, patient states that she does have some postnasal drainage along with that patient states that her shortness of breath is stable, patient states that she had run out of the prescription for Trelegy Ellipta, patient also states that she does have occasional wheezing, patient on questioning further states that she is having some increased dizziness and patient states that her mother keeps the house at 80 °F in terms of heating which gets to her, patient also has some dryness of the mouth, patient also states that on Friday night she was about to take the dog out and patient states that she felt so hot and the next thing she remembers is that she was soaking wet and patient states that she was lying on the ground, patient had some right hip pain and discomfort along with some hip pain but patient did not seek any

## 2024-02-13 NOTE — PATIENT INSTRUCTIONS
2 years can be dismissed from the practice.     Please be aware that our physicians are required to work in the Intensive Care Unit at [facility].  Your appointment may need to be rescheduled if they are designated to work during your appointment time.      You may receive a survey regarding the care you received during your visit.  Your input is valuable to us.  We encourage you to complete and return your survey.  We hope you will choose us in the future for your healthcare needs.     Pt instructed of all future appointment dates & times, including radiology, labs, procedures & referrals. If procedures were scheduled preparation instructions provided. Instructions on future appointments with [facility] were given.

## 2024-02-13 NOTE — PROGRESS NOTES
Discussed with the patient the current USPSTF guidelines released March 9, 2021 for screening for lung cancer.    For adults aged 50 to 80 years who have a 20 pack-year smoking history and currently smoke or have quit within the past 15 years the grade B recommendation is to:  Screen for lung cancer with low-dose computed tomography (LDCT) every year.  Stop screening once a person has not smoked for 15 years or has a health problem that limits life expectancy or the ability to have lung surgery.    The patient  reports that she has been smoking cigarettes. She started smoking about 50 years ago. She has a 33.0 pack-year smoking history. She has never used smokeless tobacco.. Discussed with patient the risks and benefits of screening, including over-diagnosis, false positive rate, and total radiation exposure.  The patient currently exhibits no signs or symptoms suggestive of lung cancer.  Discussed with patient the importance of compliance with yearly annual lung cancer screenings and willingness to undergo diagnosis and treatment if screening scan is positive.  In addition, the patient was counseled regarding the importance of remaining smoke free and/or total smoking cessation.    Also reviewed the following if the patient has Medicare that as of February 10, 2022, Medicare only covers LDCT screening in patients aged 50-77 with at least a 20 pack-year smoking history who currently smoke or have quit in the last 15 years

## 2024-02-29 DIAGNOSIS — J43.2 CENTRILOBULAR EMPHYSEMA (HCC): ICD-10-CM

## 2024-02-29 DIAGNOSIS — J41.0 SIMPLE CHRONIC BRONCHITIS (HCC): ICD-10-CM

## 2024-03-01 RX ORDER — FLUTICASONE FUROATE, UMECLIDINIUM BROMIDE AND VILANTEROL TRIFENATATE 100; 62.5; 25 UG/1; UG/1; UG/1
1 POWDER RESPIRATORY (INHALATION) DAILY
Qty: 60 EACH | Refills: 3 | Status: SHIPPED | OUTPATIENT
Start: 2024-03-01

## 2024-03-15 ENCOUNTER — TELEPHONE (OUTPATIENT)
Dept: PULMONOLOGY | Age: 74
End: 2024-03-15

## 2024-03-15 NOTE — TELEPHONE ENCOUNTER
Pt called and said she started yesterday with congestion, wheezing and cough with yellow mucous.  She denies any fever.  Pt said she is very tired.  She has not tested for COVID but states she never leaves her house.    Pt is asking for meds to be sent to [facility] on [address] in [place].

## 2024-03-15 NOTE — TELEPHONE ENCOUNTER
I called pt back and informed her that NP had left for the day and this message would not be addressed until next week.    I advised her to call her PCP or go to Urgent Care for evaluation.  She said if she is no better by tomorrow, she will go to urgent care.

## 2024-03-16 ENCOUNTER — OFFICE VISIT (OUTPATIENT)
Dept: URGENT CARE | Age: 74
End: 2024-03-16

## 2024-03-16 VITALS
HEIGHT: 62 IN | HEART RATE: 55 BPM | RESPIRATION RATE: 16 BRPM | WEIGHT: 89.2 LBS | BODY MASS INDEX: 16.41 KG/M2 | DIASTOLIC BLOOD PRESSURE: 60 MMHG | SYSTOLIC BLOOD PRESSURE: 126 MMHG | OXYGEN SATURATION: 93 % | TEMPERATURE: 98.1 F

## 2024-03-16 DIAGNOSIS — J44.1 CHRONIC OBSTRUCTIVE PULMONARY DISEASE WITH ACUTE EXACERBATION (HCC): Primary | ICD-10-CM

## 2024-03-16 DIAGNOSIS — R05.9 COUGH, UNSPECIFIED TYPE: ICD-10-CM

## 2024-03-16 DIAGNOSIS — R06.02 SOB (SHORTNESS OF BREATH): ICD-10-CM

## 2024-03-16 DIAGNOSIS — J06.9 UPPER RESPIRATORY TRACT INFECTION, UNSPECIFIED TYPE: ICD-10-CM

## 2024-03-16 DIAGNOSIS — R09.89 RHONCHI AT BOTH LUNG BASES: ICD-10-CM

## 2024-03-16 LAB
INFLUENZA A ANTIBODY: NORMAL
INFLUENZA B ANTIBODY: NORMAL
Lab: NORMAL
PERFORMING INSTRUMENT: NORMAL
QC PASS/FAIL: NORMAL
SARS-COV-2, POC: NORMAL

## 2024-03-16 RX ORDER — ALBUTEROL SULFATE 2.5 MG/3ML
2.5 SOLUTION RESPIRATORY (INHALATION) ONCE
Status: COMPLETED | OUTPATIENT
Start: 2024-03-16 | End: 2024-03-16

## 2024-03-16 RX ORDER — AZITHROMYCIN 250 MG/1
TABLET, FILM COATED ORAL
Qty: 6 TABLET | Refills: 0 | Status: SHIPPED | OUTPATIENT
Start: 2024-03-16 | End: 2024-03-26

## 2024-03-16 RX ORDER — GUAIFENESIN 600 MG/1
600 TABLET, EXTENDED RELEASE ORAL 2 TIMES DAILY
Qty: 20 TABLET | Refills: 0 | Status: SHIPPED | OUTPATIENT
Start: 2024-03-16 | End: 2024-03-26

## 2024-03-16 RX ORDER — PREDNISONE 10 MG/1
10 TABLET ORAL 2 TIMES DAILY
Qty: 10 TABLET | Refills: 0 | Status: SHIPPED | OUTPATIENT
Start: 2024-03-16 | End: 2024-03-21

## 2024-03-16 RX ORDER — BENZONATATE 200 MG/1
200 CAPSULE ORAL 3 TIMES DAILY PRN
Qty: 30 CAPSULE | Refills: 0 | Status: SHIPPED | OUTPATIENT
Start: 2024-03-16 | End: 2024-03-26

## 2024-03-16 RX ADMIN — ALBUTEROL SULFATE 2.5 MG: 2.5 SOLUTION RESPIRATORY (INHALATION) at 16:37

## 2024-03-16 NOTE — PATIENT INSTRUCTIONS
XR CHEST PA/LAT   Final Result   No acute process.           In clinic Flu testing:  Influenza A: Negative.  Influenza B: Negative.  In clinic COVID test: Negative.  Azithromycin and Prednisone burst prescribed for COPD exacerbation - take as directed.  Benzonatate prescribed for cough relief.  Guaifenesin prescribed for expectorant therapy.  Recommend OTC treatment for symptoms:  ibuprofen (Advil, Motrin) and acetaminophen (Tylenol) for fevers and pain relief.  antihistamines Claritin, Zyrtec, Allegra, or Xyzal, or even Coricidin,  and nasal steroid sprays (such as Flonase) to help with nasal congestion and runny nose.  guaifenesin (Mucinex) can help with thinning out mucus which can help with chest congestion or with relieving persistent sinus pressure  throat sprays (Cepacol, chloraseptic) for throat pain.  dextromethorphan (Robitussin, Delsym), throat lozenges, or honey (1-2 teaspoons every hour) for cough.  warm teas, humidifiers, nasal lavages, and sleeping in an inclined position are also helpful options that can lessen symptoms.    Follow up with your PCP within 5 days or, if unable, you can return to the clinic if symptoms persist beyond 5 days or if symptoms worsen.    If you develop worsening symptoms, increased shortness of breath, or chest pain symptoms, follow up immediately with the ER for further evaluation and treatment.    New Prescriptions    AZITHROMYCIN (ZITHROMAX) 250 MG TABLET    500mg on day 1 followed by 250mg on days 2 - 5    BENZONATATE (TESSALON) 200 MG CAPSULE    Take 1 capsule by mouth 3 times daily as needed for Cough    GUAIFENESIN (MUCINEX) 600 MG EXTENDED RELEASE TABLET    Take 1 tablet by mouth 2 times daily for 10 days    PREDNISONE (DELTASONE) 10 MG TABLET    Take 1 tablet by mouth 2 times daily for 5 days

## 2024-03-16 NOTE — PROGRESS NOTES
Anna Phelan (: 1950) is a 73 y.o. female, New patient, here for evaluation of the following chief complaint(s):  URI (Covid & Flu Test ) and Cough (2 days )      ASSESSMENT/PLAN:    ICD-10-CM    1. Chronic obstructive pulmonary disease with acute exacerbation (HCC)  J44.1 azithromycin (ZITHROMAX) 250 MG tablet     predniSONE (DELTASONE) 10 MG tablet      2. Upper respiratory tract infection, unspecified type  J06.9 POCT COVID-19, Antigen     POCT Influenza A/B     benzonatate (TESSALON) 200 MG capsule     guaiFENesin (MUCINEX) 600 MG extended release tablet      3. Cough, unspecified type  R05.9 POCT COVID-19, Antigen     POCT Influenza A/B     XR CHEST PA/LAT     albuterol (PROVENTIL) (2.5 MG/3ML) 0.083% nebulizer solution 2.5 mg     benzonatate (TESSALON) 200 MG capsule     guaiFENesin (MUCINEX) 600 MG extended release tablet      4. Rhonchi at both lung bases  R09.89 XR CHEST PA/LAT     albuterol (PROVENTIL) (2.5 MG/3ML) 0.083% nebulizer solution 2.5 mg      5. SOB (shortness of breath)  R06.02 XR CHEST PA/LAT     albuterol (PROVENTIL) (2.5 MG/3ML) 0.083% nebulizer solution 2.5 mg          In clinic Flu testing:  Influenza A: Negative.  Influenza B: Negative.  In clinic COVID test: Negative.    Given mild rhonchi, wheezing, and hx of SOB, x-ray obtained to rule out concerns of pneumonia  Initial x-ray impression: No focal lung consolidations or pulmonary edema identified.   Radiology impression:  No acute process.  Without pneumonia noted on exam, concern for COPD exacerbation.  Low concern for bacterial sinusitis, otitis media, Strep pharyngitis, respiratory distress, pneumonia, bronchitis, and PE.  Albuterol nebulizer treatment provided in clinic without complications - pt tolerated the treatment well and noted decreased wheezing, SOB sensation.  Azithromycin and Prednisone burst prescribed for treatment of COPD exacerbation.  Benzonatate prescribed for cough relief  Guaifenesin prescribed for

## 2024-04-11 ENCOUNTER — OFFICE VISIT (OUTPATIENT)
Dept: PULMONOLOGY | Age: 74
End: 2024-04-11
Payer: MEDICARE

## 2024-04-11 VITALS
TEMPERATURE: 97.9 F | WEIGHT: 91.2 LBS | DIASTOLIC BLOOD PRESSURE: 79 MMHG | BODY MASS INDEX: 16.78 KG/M2 | OXYGEN SATURATION: 95 % | HEIGHT: 62 IN | SYSTOLIC BLOOD PRESSURE: 142 MMHG | RESPIRATION RATE: 18 BRPM | HEART RATE: 75 BPM

## 2024-04-11 DIAGNOSIS — J44.1 COPD WITH ACUTE EXACERBATION (HCC): Primary | ICD-10-CM

## 2024-04-11 DIAGNOSIS — J44.9 PULMONARY CACHEXIA DUE TO CHRONIC OBSTRUCTIVE PULMONARY DISEASE (HCC): ICD-10-CM

## 2024-04-11 DIAGNOSIS — F17.200 TOBACCO USE DISORDER: ICD-10-CM

## 2024-04-11 DIAGNOSIS — R64 PULMONARY CACHEXIA DUE TO CHRONIC OBSTRUCTIVE PULMONARY DISEASE (HCC): ICD-10-CM

## 2024-04-11 DIAGNOSIS — J44.9 COPD, MODERATE (HCC): ICD-10-CM

## 2024-04-11 PROCEDURE — 3077F SYST BP >= 140 MM HG: CPT | Performed by: NURSE PRACTITIONER

## 2024-04-11 PROCEDURE — 4004F PT TOBACCO SCREEN RCVD TLK: CPT | Performed by: NURSE PRACTITIONER

## 2024-04-11 PROCEDURE — 3023F SPIROM DOC REV: CPT | Performed by: NURSE PRACTITIONER

## 2024-04-11 PROCEDURE — 3078F DIAST BP <80 MM HG: CPT | Performed by: NURSE PRACTITIONER

## 2024-04-11 PROCEDURE — G8419 CALC BMI OUT NRM PARAM NOF/U: HCPCS | Performed by: NURSE PRACTITIONER

## 2024-04-11 PROCEDURE — 1090F PRES/ABSN URINE INCON ASSESS: CPT | Performed by: NURSE PRACTITIONER

## 2024-04-11 PROCEDURE — 99214 OFFICE O/P EST MOD 30 MIN: CPT | Performed by: NURSE PRACTITIONER

## 2024-04-11 PROCEDURE — G8427 DOCREV CUR MEDS BY ELIG CLIN: HCPCS | Performed by: NURSE PRACTITIONER

## 2024-04-11 PROCEDURE — G8400 PT W/DXA NO RESULTS DOC: HCPCS | Performed by: NURSE PRACTITIONER

## 2024-04-11 PROCEDURE — 3017F COLORECTAL CA SCREEN DOC REV: CPT | Performed by: NURSE PRACTITIONER

## 2024-04-11 PROCEDURE — 1123F ACP DISCUSS/DSCN MKR DOCD: CPT | Performed by: NURSE PRACTITIONER

## 2024-04-11 RX ORDER — PREDNISONE 20 MG/1
20 TABLET ORAL DAILY
Qty: 5 TABLET | Refills: 0 | Status: SHIPPED | OUTPATIENT
Start: 2024-04-11 | End: 2024-04-16

## 2024-04-11 RX ORDER — GUAIFENESIN 600 MG/1
600 TABLET, EXTENDED RELEASE ORAL 2 TIMES DAILY
Qty: 30 TABLET | Refills: 0 | Status: SHIPPED | OUTPATIENT
Start: 2024-04-11 | End: 2024-04-26

## 2024-04-11 ASSESSMENT — ENCOUNTER SYMPTOMS
VOMITING: 0
SORE THROAT: 0
NAUSEA: 1
WHEEZING: 0
DIARRHEA: 0
CHEST TIGHTNESS: 0
ABDOMINAL PAIN: 0
COUGH: 1
SHORTNESS OF BREATH: 1
EYE PAIN: 0
RHINORRHEA: 1

## 2024-04-11 NOTE — PROGRESS NOTES
MA Communication:  The following orders are received by verbal communication from Valencia Carrillo CNP    Orders include:  ONPO, 6MW, ct, follow up with sofía claudio

## 2024-04-11 NOTE — PATIENT INSTRUCTIONS
receive top notch care and our surveys help keep us accountable. However, if your experience was not a good one, we want to hear about that as well. This is a key way we can keep track of problems and strive to correct any for future visits.    Again, we appreciate your time and thank you for choosing [facility]!    LUIS Saenz      Overnight oximetry on room air    May use Mucinex or guaifenesin 600 mg twice a day to help thin secretions. Drink with plenty of water.      Return to clinic in May as scheduled with Dr. Hand after CT scan     Patient understands that smoking is aggravating the respiratory disease and must be discontinued.

## 2024-04-11 NOTE — PROGRESS NOTES
Anna Phelan is a 73 y.o. who comes in today for Shortness of Breath   She was treated at Urgent Care on 3/16/24  and treated with antibiotics and steroids for a COPD exacerbation / sinusitis. She was negative for flu and COVID at that time. States she was doing better but then back to having SOB.  She states she is SOB with minimal activity that has been for a while now.  She is wondering if she may need oxygen.   She continues to smoke a few cig a day. She continues Trelegy and using albuterol 4 times a day.  She has a LDCT scheduled in a couple of weeks. She is here with her son today.          Past Medical History:   Diagnosis Date    Anxiety     Asthma     COPD (chronic obstructive pulmonary disease) (HCC)     Emphysema of lung (HCC)     Hypertension     Palpitations 9/23/2022        Past Surgical History:   Procedure Laterality Date    CARPAL TUNNEL RELEASE Bilateral     EYE SURGERY      KNEE SURGERY Left     SINUS SURGERY      SKIN CANCER EXCISION      basal cell removal under left eye        Family History   Problem Relation Age of Onset    High Blood Pressure Mother     Asthma Mother     Cancer Father         renal cell carcinoma    Other Sister         fibromyalgia    Other Maternal Grandfather         black lung    Colon Cancer Neg Hx     Breast Cancer Neg Hx         Allergies   Allergen Reactions    Amoxicillin Other (See Comments)    Ampicillin Other (See Comments)    Doxycycline     Lidocaine Other (See Comments)       Current Outpatient Medications   Medication Sig Dispense Refill    guaiFENesin (MUCINEX) 600 MG extended release tablet Take 1 tablet by mouth 2 times daily for 15 days 30 tablet 0    predniSONE (DELTASONE) 20 MG tablet Take 1 tablet by mouth daily for 5 days 5 tablet 0    fluticasone-umeclidin-vilant (TRELEGY ELLIPTA) 100-62.5-25 MCG/ACT AEPB inhaler INHALE 1 PUFF INTO THE LUNGS DAILY 60 each 3    albuterol sulfate HFA (PROAIR HFA) 108 (90 Base) MCG/ACT inhaler Inhale 2 puffs into the

## 2024-04-12 ENCOUNTER — TELEPHONE (OUTPATIENT)
Dept: PRIMARY CARE CLINIC | Age: 74
End: 2024-04-12

## 2024-04-12 NOTE — TELEPHONE ENCOUNTER
----- Message from Venus Moore MD sent at 4/12/2024  3:35 PM EDT -----  Call to  check in on her please. See if she would like us to f/u with her at all for her SOB or just in May as planned. Thank you!  ----- Message -----  From: Valencia Carrillo APRN - CNP  Sent: 4/11/2024   2:41 PM EDT  To: Venus Moore MD

## 2024-04-25 ENCOUNTER — TELEPHONE (OUTPATIENT)
Dept: PULMONOLOGY | Age: 74
End: 2024-04-25

## 2024-04-25 NOTE — TELEPHONE ENCOUNTER
Overnight oximetry reviewed less than 89% only 1 min 11 sec.  Anna does not need oxygen at night at this time.  Await 6 minute walk testing.   Please let her know.

## 2024-05-16 ENCOUNTER — OFFICE VISIT (OUTPATIENT)
Dept: PULMONOLOGY | Age: 74
End: 2024-05-16
Payer: MEDICARE

## 2024-05-16 VITALS
TEMPERATURE: 97.6 F | OXYGEN SATURATION: 95 % | BODY MASS INDEX: 17.11 KG/M2 | HEART RATE: 88 BPM | RESPIRATION RATE: 16 BRPM | HEIGHT: 62 IN | DIASTOLIC BLOOD PRESSURE: 59 MMHG | SYSTOLIC BLOOD PRESSURE: 124 MMHG | WEIGHT: 93 LBS

## 2024-05-16 DIAGNOSIS — F17.200 TOBACCO USE DISORDER: ICD-10-CM

## 2024-05-16 DIAGNOSIS — K21.9 GASTROESOPHAGEAL REFLUX DISEASE WITHOUT ESOPHAGITIS: Primary | ICD-10-CM

## 2024-05-16 DIAGNOSIS — G47.30 OBSERVED SLEEP APNEA: ICD-10-CM

## 2024-05-16 DIAGNOSIS — R64 PULMONARY CACHEXIA DUE TO CHRONIC OBSTRUCTIVE PULMONARY DISEASE (HCC): ICD-10-CM

## 2024-05-16 DIAGNOSIS — J43.2 CENTRILOBULAR EMPHYSEMA (HCC): ICD-10-CM

## 2024-05-16 DIAGNOSIS — J44.9 PULMONARY CACHEXIA DUE TO CHRONIC OBSTRUCTIVE PULMONARY DISEASE (HCC): ICD-10-CM

## 2024-05-16 PROCEDURE — 1090F PRES/ABSN URINE INCON ASSESS: CPT | Performed by: INTERNAL MEDICINE

## 2024-05-16 PROCEDURE — 99214 OFFICE O/P EST MOD 30 MIN: CPT | Performed by: INTERNAL MEDICINE

## 2024-05-16 PROCEDURE — 3017F COLORECTAL CA SCREEN DOC REV: CPT | Performed by: INTERNAL MEDICINE

## 2024-05-16 PROCEDURE — G8419 CALC BMI OUT NRM PARAM NOF/U: HCPCS | Performed by: INTERNAL MEDICINE

## 2024-05-16 PROCEDURE — 3023F SPIROM DOC REV: CPT | Performed by: INTERNAL MEDICINE

## 2024-05-16 PROCEDURE — 3074F SYST BP LT 130 MM HG: CPT | Performed by: INTERNAL MEDICINE

## 2024-05-16 PROCEDURE — 4004F PT TOBACCO SCREEN RCVD TLK: CPT | Performed by: INTERNAL MEDICINE

## 2024-05-16 PROCEDURE — G8400 PT W/DXA NO RESULTS DOC: HCPCS | Performed by: INTERNAL MEDICINE

## 2024-05-16 PROCEDURE — G8427 DOCREV CUR MEDS BY ELIG CLIN: HCPCS | Performed by: INTERNAL MEDICINE

## 2024-05-16 PROCEDURE — 1123F ACP DISCUSS/DSCN MKR DOCD: CPT | Performed by: INTERNAL MEDICINE

## 2024-05-16 PROCEDURE — 3078F DIAST BP <80 MM HG: CPT | Performed by: INTERNAL MEDICINE

## 2024-05-16 NOTE — PROGRESS NOTES
Chief Complaint/Referring Provider:  Pulmonary follow up to discuss clinical status,test results  and options    Patient is a 73-year-old female who has come to the office for a pulmonary follow-up, patient states that she still has some deep cough, patient also has occasional phlegm which is clear in nature, patient has been having some increased rhinorrhea partly because of pollen, patient also states that her ears are sensitive, patient denies any significant pleuritic chest pain,, no fever no chills, patient's appetite fluctuates, patient states that she cannot eat too much and also cannot eat any fried food, patient has significant reflux symptoms and patient states that she was on PPI but has been set up with the patient which was discontinued by her PCP and was told to follow-up with a GI doctor, patient does not have any significant altered bowel habits, no hematochezia or melena, no increasing leg swelling, patient barely smokes now, patient does not have any new medications, no change in the abdomen abdomen, no other pertinent review of system of concern    Previous  HPIPatient is a 73-year-old female was come to the office for a pulmonary evaluation, patient states that she feels tired and having no energy, patient also states that when she does some activities like vacuuming and all she has tachycardia at that time, patient also has been having some postnasal drainage, patient's left ear has been having some discomfort with that, patient has phlegm which is clear in nature most of the time but has had some tinge of yellow, patient has significant reflux symptoms which are causing her to be symptomatic and patient states that her PCP does not want to give her PPI and wants her to see a GI, patient has been eating some Gummies with apple cider with transient improvement, patient also has minimal wheezing at times, patient has lost some weight, patient states that she continues to smoke but patient states

## 2024-05-16 NOTE — PROGRESS NOTES
MA Communication:  The following orders are received by verbal communication from Uvaldo Hand MD    Orders include:    6 months

## 2024-05-16 NOTE — PATIENT INSTRUCTIONS
Remember to bring a list of pulmonary medications and any CPAP or BiPAP machines to your next appointment with the office.     Please keep all of your future appointments scheduled by [facility] office. Out of respect for other patients and providers, you may be asked to reschedule your appointment if you arrive later than your scheduled appointment time. Appointments cancelled less than 24hrs in advance will be considered a no show. Patients with three missed appointments within 1 year or four missed appointments within 2 years can be dismissed from the practice.     Please be aware that our physicians are required to work in the Intensive Care Unit at [facility].  Your appointment may need to be rescheduled if they are designated to work during your appointment time.      You may receive a survey regarding the care you received during your visit.  Your input is valuable to us.  We encourage you to complete and return your survey.  We hope you will choose us in the future for your healthcare needs.     Pt instructed of all future appointment dates & times, including radiology, labs, procedures & referrals. If procedures were scheduled preparation instructions provided. Instructions on future appointments with [facility] were given.

## 2024-06-13 DIAGNOSIS — I10 ESSENTIAL HYPERTENSION: ICD-10-CM

## 2024-06-13 DIAGNOSIS — J43.2 CENTRILOBULAR EMPHYSEMA (HCC): ICD-10-CM

## 2024-06-13 DIAGNOSIS — J41.0 SIMPLE CHRONIC BRONCHITIS (HCC): ICD-10-CM

## 2024-06-13 RX ORDER — ALBUTEROL SULFATE 90 UG/1
2 AEROSOL, METERED RESPIRATORY (INHALATION) EVERY 6 HOURS PRN
Qty: 6.7 G | Refills: 2 | Status: SHIPPED | OUTPATIENT
Start: 2024-06-13

## 2024-06-14 RX ORDER — LISINOPRIL 2.5 MG/1
2.5 TABLET ORAL DAILY
Qty: 90 TABLET | Refills: 0 | Status: SHIPPED | OUTPATIENT
Start: 2024-06-14 | End: 2024-09-12

## 2024-07-10 DIAGNOSIS — J41.0 SIMPLE CHRONIC BRONCHITIS (HCC): ICD-10-CM

## 2024-07-10 DIAGNOSIS — J43.2 CENTRILOBULAR EMPHYSEMA (HCC): ICD-10-CM

## 2024-07-11 RX ORDER — FLUTICASONE FUROATE, UMECLIDINIUM BROMIDE AND VILANTEROL TRIFENATATE 100; 62.5; 25 UG/1; UG/1; UG/1
1 POWDER RESPIRATORY (INHALATION) DAILY
Qty: 60 EACH | Refills: 5 | Status: SHIPPED | OUTPATIENT
Start: 2024-07-11

## 2024-07-12 ENCOUNTER — OFFICE VISIT (OUTPATIENT)
Dept: PRIMARY CARE CLINIC | Age: 74
End: 2024-07-12

## 2024-07-12 VITALS
HEART RATE: 90 BPM | HEIGHT: 63 IN | RESPIRATION RATE: 17 BRPM | BODY MASS INDEX: 16.3 KG/M2 | WEIGHT: 92 LBS | OXYGEN SATURATION: 96 % | DIASTOLIC BLOOD PRESSURE: 82 MMHG | TEMPERATURE: 98.2 F | SYSTOLIC BLOOD PRESSURE: 122 MMHG

## 2024-07-12 DIAGNOSIS — I10 ESSENTIAL HYPERTENSION: Primary | ICD-10-CM

## 2024-07-12 SDOH — ECONOMIC STABILITY: FOOD INSECURITY: WITHIN THE PAST 12 MONTHS, YOU WORRIED THAT YOUR FOOD WOULD RUN OUT BEFORE YOU GOT MONEY TO BUY MORE.: NEVER TRUE

## 2024-07-12 SDOH — ECONOMIC STABILITY: FOOD INSECURITY: WITHIN THE PAST 12 MONTHS, THE FOOD YOU BOUGHT JUST DIDN'T LAST AND YOU DIDN'T HAVE MONEY TO GET MORE.: NEVER TRUE

## 2024-07-12 SDOH — ECONOMIC STABILITY: INCOME INSECURITY: HOW HARD IS IT FOR YOU TO PAY FOR THE VERY BASICS LIKE FOOD, HOUSING, MEDICAL CARE, AND HEATING?: NOT HARD AT ALL

## 2024-07-12 ASSESSMENT — PATIENT HEALTH QUESTIONNAIRE - PHQ9
SUM OF ALL RESPONSES TO PHQ QUESTIONS 1-9: 0
SUM OF ALL RESPONSES TO PHQ9 QUESTIONS 1 & 2: 0
SUM OF ALL RESPONSES TO PHQ QUESTIONS 1-9: 0
2. FEELING DOWN, DEPRESSED OR HOPELESS: NOT AT ALL
1. LITTLE INTEREST OR PLEASURE IN DOING THINGS: NOT AT ALL

## 2024-07-12 ASSESSMENT — ENCOUNTER SYMPTOMS
VOMITING: 0
COUGH: 0
SHORTNESS OF BREATH: 0
BLOOD IN STOOL: 0
DIARRHEA: 0
NAUSEA: 0
ABDOMINAL PAIN: 0
COLOR CHANGE: 0

## 2024-07-12 NOTE — PROGRESS NOTES
Anna Phelan is a 74 y.o. year old female here for:    Chief Complaint:    Chief Complaint   Patient presents with    Hypertension     Subjective:    Today, her current concerns include:    HPI:  #HTN  - Onset: Years  - Quality: Asymptomatic  - Timing/Duration: Constant and daily condition   - Progression: Stable  - Modifiers: Lisinopril 2.5 mg QD - tolerating well and reporting good adherence  - Associated Symptoms: Per ROS as otherwise stated in this note    Past Medical History:   Diagnosis Date    Anxiety     Asthma     COPD (chronic obstructive pulmonary disease) (HCC)     Emphysema of lung (HCC)     Hypertension     Palpitations 9/23/2022     Social History     Tobacco Use    Smoking status: Every Day     Current packs/day: 0.65     Average packs/day: 0.7 packs/day for 51.1 years (33.2 ttl pk-yrs)     Types: Cigarettes     Start date: 5/27/1973    Smokeless tobacco: Never    Tobacco comments:     09/08/22 3-4 cigs a day, per smoking hx audit, pt smoked 40 years in 2020, at heaviest pt smoked 1 ppd, current 0.25 ppd, average 0.65   Vaping Use    Vaping Use: Never used   Substance Use Topics    Alcohol use: No    Drug use: No     Family History   Problem Relation Age of Onset    High Blood Pressure Mother     Asthma Mother     Cancer Father         renal cell carcinoma    Other Sister         fibromyalgia    Other Maternal Grandfather         black lung    Colon Cancer Neg Hx     Breast Cancer Neg Hx      Past Surgical History:   Procedure Laterality Date    CARPAL TUNNEL RELEASE Bilateral     EYE SURGERY      KNEE SURGERY Left     SINUS SURGERY      SKIN CANCER EXCISION      basal cell removal under left eye       Current Outpatient Medications:     TRELEGY ELLIPTA 100-62.5-25 MCG/ACT AEPB inhaler, INHALE 1 PUFF INTO THE LUNGS DAILY, Disp: 60 each, Rfl: 5    lisinopril (PRINIVIL;ZESTRIL) 2.5 MG tablet, TAKE 1 TABLET BY MOUTH DAILY, Disp: 90 tablet, Rfl: 0    albuterol sulfate HFA (PROVENTIL;VENTOLIN;PROAIR)

## 2024-07-12 NOTE — ASSESSMENT & PLAN NOTE
Well controlled. Asymptomatic. Labs ordered previously and not done - reprinted and encouraged to get these. Fall/call back/ED precautions discussed.    Blood Pressure Goal:  [ ] < 130/80 (ACC/AHA)  [x] < 140/90 (JNC-8 for age < 60, or CKD, or DM)  [ ] < 150/90 (JNC-8 for age > 60)

## 2024-07-12 NOTE — PATIENT INSTRUCTIONS
Please find our [facility] Location Guide below for your convenience!    CENTRAL LOCATIONS    1) [facility]  [address]  [place], [place] [address]  Phone: [phone]    2) [facility]  [address], [place] [address]  Phone: [phone]    [facility] LOCATIONS    3) [facility]  [address]  [place], [place] [address]  Phone: [phone]    4) [facility]  [address] [place] [address]  Phone: [phone]    5) [facility]  [address] [place] [address]  Phone: [phone]    6) [facility]  [address]  [place] [address]   Phone: [phone]    [place] LOCATIONS    7) [facility]  [address]  [place] [address]  Phone: [phone]    8) [facility]  [address] [place] [address]  Phone: [phone]    9) [facility]  [address] [place] [address]  Phone: [phone]    10) [facility]  [address]  [place] [address]  Phone: [phone]    [place] LOCATIONS    11) [facility]  [address]  [place] [address]  Phone: [phone]    12) [facility]  [address] [place]  [place] [address]  Phone: [phone]    [facility] LOCATIONS    13) [facility]  [address]  [place] [address]  Phone: [phone]    14) [facility]  [address] [place] [address]  Phone: [phone]

## 2024-09-09 DIAGNOSIS — I10 ESSENTIAL HYPERTENSION: ICD-10-CM

## 2024-09-10 LAB
ANION GAP SERPL CALCULATED.3IONS-SCNC: 11 MMOL/L (ref 3–16)
BUN SERPL-MCNC: 14 MG/DL (ref 7–20)
CALCIUM SERPL-MCNC: 9.8 MG/DL (ref 8.3–10.6)
CHLORIDE SERPL-SCNC: 99 MMOL/L (ref 99–110)
CO2 SERPL-SCNC: 26 MMOL/L (ref 21–32)
CREAT SERPL-MCNC: 0.8 MG/DL (ref 0.6–1.2)
GFR SERPLBLD CREATININE-BSD FMLA CKD-EPI: 78 ML/MIN/{1.73_M2}
GLUCOSE SERPL-MCNC: 87 MG/DL (ref 70–99)
POTASSIUM SERPL-SCNC: 5.1 MMOL/L (ref 3.5–5.1)
SODIUM SERPL-SCNC: 136 MMOL/L (ref 136–145)

## 2024-09-11 ENCOUNTER — TELEPHONE (OUTPATIENT)
Dept: PRIMARY CARE CLINIC | Age: 74
End: 2024-09-11

## 2024-09-29 DIAGNOSIS — I10 ESSENTIAL HYPERTENSION: ICD-10-CM

## 2024-09-30 RX ORDER — LISINOPRIL 2.5 MG/1
2.5 TABLET ORAL DAILY
Qty: 90 TABLET | Refills: 0 | Status: SHIPPED | OUTPATIENT
Start: 2024-09-30 | End: 2024-12-29

## 2024-10-13 DIAGNOSIS — J43.2 CENTRILOBULAR EMPHYSEMA (HCC): ICD-10-CM

## 2024-10-13 DIAGNOSIS — J41.0 SIMPLE CHRONIC BRONCHITIS (HCC): ICD-10-CM

## 2024-10-14 RX ORDER — ALBUTEROL SULFATE 90 UG/1
2 INHALANT RESPIRATORY (INHALATION) EVERY 6 HOURS PRN
Qty: 6.7 G | Refills: 2 | Status: SHIPPED | OUTPATIENT
Start: 2024-10-14

## 2024-10-21 ENCOUNTER — OFFICE VISIT (OUTPATIENT)
Dept: URGENT CARE | Age: 74
End: 2024-10-21

## 2024-10-21 VITALS
HEART RATE: 122 BPM | HEIGHT: 60 IN | DIASTOLIC BLOOD PRESSURE: 84 MMHG | OXYGEN SATURATION: 93 % | TEMPERATURE: 98.1 F | WEIGHT: 88 LBS | SYSTOLIC BLOOD PRESSURE: 146 MMHG | BODY MASS INDEX: 17.28 KG/M2 | RESPIRATION RATE: 28 BRPM

## 2024-10-21 DIAGNOSIS — I10 UNCONTROLLED HYPERTENSION: ICD-10-CM

## 2024-10-21 DIAGNOSIS — R06.2 WHEEZING: ICD-10-CM

## 2024-10-21 DIAGNOSIS — J44.1 CHRONIC OBSTRUCTIVE PULMONARY DISEASE WITH ACUTE EXACERBATION (HCC): Primary | ICD-10-CM

## 2024-10-21 DIAGNOSIS — R05.1 ACUTE COUGH: ICD-10-CM

## 2024-10-21 RX ORDER — ALBUTEROL SULFATE 0.83 MG/ML
2.5 SOLUTION RESPIRATORY (INHALATION) ONCE
Status: COMPLETED | OUTPATIENT
Start: 2024-10-21 | End: 2024-10-21

## 2024-10-21 RX ORDER — IPRATROPIUM BROMIDE AND ALBUTEROL SULFATE 2.5; .5 MG/3ML; MG/3ML
1 SOLUTION RESPIRATORY (INHALATION) ONCE
Status: COMPLETED | OUTPATIENT
Start: 2024-10-21 | End: 2024-10-21

## 2024-10-21 RX ORDER — GUAIFENESIN 600 MG/1
600 TABLET, EXTENDED RELEASE ORAL 2 TIMES DAILY
Qty: 20 TABLET | Refills: 0 | Status: SHIPPED | OUTPATIENT
Start: 2024-10-21 | End: 2024-10-31

## 2024-10-21 RX ORDER — PREDNISONE 10 MG/1
10 TABLET ORAL 2 TIMES DAILY
Qty: 10 TABLET | Refills: 0 | Status: SHIPPED | OUTPATIENT
Start: 2024-10-21 | End: 2024-10-26

## 2024-10-21 RX ORDER — BENZONATATE 200 MG/1
200 CAPSULE ORAL 3 TIMES DAILY PRN
Qty: 30 CAPSULE | Refills: 0 | Status: SHIPPED | OUTPATIENT
Start: 2024-10-21 | End: 2024-10-31

## 2024-10-21 RX ORDER — AZITHROMYCIN 250 MG/1
TABLET, FILM COATED ORAL
Qty: 6 TABLET | Refills: 0 | Status: SHIPPED | OUTPATIENT
Start: 2024-10-21 | End: 2024-10-31

## 2024-10-21 RX ADMIN — IPRATROPIUM BROMIDE AND ALBUTEROL SULFATE 1 DOSE: 2.5; .5 SOLUTION RESPIRATORY (INHALATION) at 16:06

## 2024-10-21 RX ADMIN — ALBUTEROL SULFATE 2.5 MG: 0.83 SOLUTION RESPIRATORY (INHALATION) at 15:40

## 2024-10-21 ASSESSMENT — VISUAL ACUITY: OU: 1

## 2024-10-21 NOTE — PATIENT INSTRUCTIONS
Albuterol and Duoneb nebulizer treatment provided in clinic for your asthma symptoms  Azithromycin prescribed for your COPD exacerbation.  Prednisone prescribed for steroid treatment of your airway inflammation from your COPD   Guaifenesin (Mucinex) prescribed for expectorant therapy.  Benzonatate prescribed for cough relief.  Recommend OTC treatment for symptoms:  acetaminophen (Tylenol) for fevers and pain relief.  antihistamines (Claritin, Zyrtec, Allegra, or Xyzal) and nasal steroid sprays (such as Flonase) to help with nasal congestion and runny nose.  guaifenesin (Mucinex) can help with thinning out mucus which can help with chest congestion or with relieving persistent sinus pressure  throat sprays (Cepacol, chloraseptic) for throat pain.  dextromethorphan (Robitussin, Delsym), throat lozenges, and increased water intake for cough.  honey (1-2 teaspoons every hour) for relief of throat irritation and coughing fits.  warm teas, humidifiers, nasal lavages, and sleeping in an inclined position are also helpful options that can lessen symptoms.  Recommend warm compresses over the symptomatic ear(s) for 10-15 minutes, or a hot shower, followed by 1-2 minutes of massaging the area behind your ears and down the jaw-line to help with the ear congestion/ear pressure.    Follow up with your PCP within 5 days or, if unable, you can return to the clinic if symptoms persist beyond 5 days or if symptoms worsen.    Please call your Primary Care Provider or your Pulmonologist's office to arrange a follow-up appointment if you are not feeling better in a few days.  Call your doctor, or follow up with the nearest emergency department, if you have worsening difficulty breathing, chest pain, fevers greater than 101°F, or other concerning symptoms.    If you develop shortness of breath, increased work of breathing, lightheadedness, if your oxygen saturation drops to 90% or below, or chest pain, contact 911, or follow up

## 2024-10-21 NOTE — PROGRESS NOTES
Anna Phelan (: 1950) is a 74 y.o. female, Established patient, here for evaluation of the following chief complaint(s):  Cough (Cough, SOB, congestion, wheezing, hx of COPD. Sx started Saturday. )      ASSESSMENT/PLAN:    ICD-10-CM    1. Chronic obstructive pulmonary disease with acute exacerbation (HCC)  J44.1 albuterol (PROVENTIL) (2.5 MG/3ML) 0.083% nebulizer solution 2.5 mg     ipratropium 0.5 mg-albuterol 2.5 mg (DUONEB) nebulizer solution 1 Dose     azithromycin (ZITHROMAX) 250 MG tablet     predniSONE (DELTASONE) 10 MG tablet     benzonatate (TESSALON) 200 MG capsule     guaiFENesin (MUCINEX) 600 MG extended release tablet      2. Wheezing  R06.2 albuterol (PROVENTIL) (2.5 MG/3ML) 0.083% nebulizer solution 2.5 mg     ipratropium 0.5 mg-albuterol 2.5 mg (DUONEB) nebulizer solution 1 Dose     predniSONE (DELTASONE) 10 MG tablet      3. Acute cough  R05.1       4. Uncontrolled hypertension  [facility]          - COPD exacerbation:  Given history of COPD, with current wheezing and SOB, concern for acute COPD exacerbation  In clinic nebulizer treatment provided to help relieve symptoms  Albuterol nebulizer provided for treatment  Exam following treatment with decreased wheezing and increased breath sounds - however pt is still experiencing SOB and remains tachypneic above 30.  Discussed with pt concerns for possible respiratory failure given pt's tachycardia and tachypnea, however pt notes having better relief from Duoneb treatments in the past and would like to trial a Duoneb treatment - discussed would trial a Duoneb treatment, however discussed if respiratory rate did not decrease below 30 and remain consistent, and SpO2 show a consistent increase above 90 would recommend pt follow up with the ED - pt is agreeable.   Duoneb nebulizer provided for continued treatment   Exam following treatment with more significantly decreased wheezing and increased breath

## 2024-10-24 ENCOUNTER — TELEPHONE (OUTPATIENT)
Dept: PRIMARY CARE CLINIC | Age: 74
End: 2024-10-24

## 2024-10-24 NOTE — TELEPHONE ENCOUNTER
LMOM for pt to call office.      ----- Message from Dr. Venus Moore MD sent at 10/24/2024 10:27 AM EDT -----  Not sure if we already tried to call to check in and offer ED f/u or at least BP check nursing.  ----- Message -----  From: Charles Barrett PA-C  Sent: 10/22/2024   9:21 AM EDT  To: Venus Moore MD

## 2024-10-25 LAB
ESTIMATED AVERAGE GLUCOSE: 120
HBA1C MFR BLD: 5.8 %

## 2024-10-28 LAB
ESTIMATED AVERAGE GLUCOSE: 143
HBA1C MFR BLD: 6.6 %

## 2024-11-13 ENCOUNTER — TELEPHONE (OUTPATIENT)
Dept: PRIMARY CARE CLINIC | Age: 74
End: 2024-11-13

## 2024-11-13 NOTE — TELEPHONE ENCOUNTER
Care Transitions Initial Follow Up Call    Outreach made within 2 business days of discharge: Yes    Patient: Anna Phelan Patient : 1950   MRN: [id number]  Reason for Admission:   Discharge Date: 12       Spoke with: Attempted to reach the PT, VML to return call to schedule TCM with PCP    Discharge department/facility: [facility]    TCM Interactive Patient Contact:  Was patient able to fill all prescriptions:   Was patient instructed to bring all medications to the follow-up visit:   Is patient taking all medications as directed in the discharge summary?   Does patient understand their discharge instructions:   Does patient have questions or concerns that need addressed prior to 7-14 day follow up office visit:     Additional needs identified to be addressed with provider               Scheduled appointment with PCP within 7-14 days    Follow Up  Future Appointments   Date Time Provider Department Center   2024  1:30 PM Valencia Carrillo APRN - [facility]   1/3/2025  1:00 PM Venus Moore MD [facility] KULWANT Reeves LPN

## 2024-11-14 ENCOUNTER — TELEPHONE (OUTPATIENT)
Dept: PRIMARY CARE CLINIC | Age: 74
End: 2024-11-14

## 2024-11-14 NOTE — TELEPHONE ENCOUNTER
Called patient to check in after recent admission and discharge and to schedule TCM. No answer received, message left asking for call back.

## 2024-11-20 ENCOUNTER — TELEPHONE (OUTPATIENT)
Dept: PRIMARY CARE CLINIC | Age: 74
End: 2024-11-20

## 2024-11-20 NOTE — TELEPHONE ENCOUNTER
Called patient once more to check in after recent admission and discharge and to schedule TCM. No answer received, message left asking for call back.

## 2024-11-25 ENCOUNTER — TELEPHONE (OUTPATIENT)
Dept: PULMONOLOGY | Age: 74
End: 2024-11-25

## 2024-11-25 NOTE — TELEPHONE ENCOUNTER
Patient did not show for pulm follow up  with Valencia on 11/25/24.    Reason:  na    This is patient's second no show.  Patient was ano show on: 01.22.24.      Patient did not reschedule.  Reschedule date:  na

## 2024-12-06 ENCOUNTER — TELEPHONE (OUTPATIENT)
Dept: PRIMARY CARE CLINIC | Age: 74
End: 2024-12-06

## 2024-12-06 NOTE — TELEPHONE ENCOUNTER
Care Transitions Initial Follow Up Call    Outreach made within 2 business days of discharge: Yes    Patient: Anna Phelan Patient : 1950   MRN: [id number]  Reason for Admission: CVA  Discharge Date: 12       Spoke with: Patient did not answer, message left asking for a call back.    Discharge department/facility: [facility] Interactive Patient Contact:  Was patient able to fill all prescriptions: N/A  Was patient instructed to bring all medications to the follow-up visit: N/A  Is patient taking all medications as directed in the discharge summary? N/A  Does patient understand their discharge instructions: N/A  Does patient have questions or concerns that need addressed prior to 7-14 day follow up office visit: no    Additional needs identified to be addressed with provider  Will try to call again.             Scheduled appointment with PCP within 7-14 days    Follow Up  Future Appointments   Date Time Provider Department Center   1/3/2025  1:00 PM Venus Moore MD [facility]       Venus Moore MD

## 2024-12-09 ENCOUNTER — TELEPHONE (OUTPATIENT)
Dept: PRIMARY CARE CLINIC | Age: 74
End: 2024-12-09

## 2024-12-09 NOTE — TELEPHONE ENCOUNTER
Called patient to check in about recent hospital admission and discharge and to schedule TCM and check in on her, no answer received, message left asking for call back.

## 2024-12-11 ENCOUNTER — TELEPHONE (OUTPATIENT)
Dept: PRIMARY CARE CLINIC | Age: 74
End: 2024-12-11

## 2025-01-01 ENCOUNTER — TELEPHONE (OUTPATIENT)
Dept: ADMINISTRATIVE | Age: 75
End: 2025-01-01

## 2025-01-03 ENCOUNTER — TELEPHONE (OUTPATIENT)
Dept: PRIMARY CARE CLINIC | Age: 75
End: 2025-01-03

## 2025-01-03 NOTE — TELEPHONE ENCOUNTER
LMOM for pt to call office to reschedule 1/3/25 appointment due to provider  being out of the office.

## 2025-01-09 ENCOUNTER — TELEPHONE (OUTPATIENT)
Dept: PRIMARY CARE CLINIC | Age: 75
End: 2025-01-09

## 2025-01-09 NOTE — TELEPHONE ENCOUNTER
Care Transitions Initial Follow Up Call    Outreach made within 2 business days of discharge: Yes    Patient: Anna Phelan Patient : 1950   MRN: [id number]  Reason for Admission: Stroke  Discharge Date: 2025      Spoke with:     Discharge department/facility:  [facility]    TCM Interactive Patient Contact:  Was patient able to fill all prescriptions: Yes  Was patient instructed to bring all medications to the follow-up visit: Yes  Is patient taking all medications as directed in the discharge summary? Yes  Does patient understand their discharge instructions: Yes  Does patient have questions or concerns that need addressed prior to 7-14 day follow up office visit: yes - see below    Additional needs identified to be addressed with provider   states patient will be discharged from rehab facility today, 25.  Dr Moore was still listed as the PCP.             Scheduled appointment with PCP within 7-14 days    Follow Up  Future Appointments   Date Time Provider Department Center   2025  2:00 PM Venus Moore MD [facility]       Hoda Lancaster MA

## 2025-01-10 ENCOUNTER — HOSPITAL ENCOUNTER (EMERGENCY)
Age: 75
Discharge: HOME | End: 2025-01-11
Attending: STUDENT IN AN ORGANIZED HEALTH CARE EDUCATION/TRAINING PROGRAM
Payer: MEDICARE

## 2025-01-10 DIAGNOSIS — Z99.81 REQUIRES SUPPLEMENTAL OXYGEN: Primary | ICD-10-CM

## 2025-01-10 PROCEDURE — 99283 EMERGENCY DEPT VISIT LOW MDM: CPT

## 2025-01-11 VITALS
HEART RATE: 97 BPM | OXYGEN SATURATION: 98 % | DIASTOLIC BLOOD PRESSURE: 76 MMHG | RESPIRATION RATE: 18 BRPM | SYSTOLIC BLOOD PRESSURE: 150 MMHG | TEMPERATURE: 98.5 F

## 2025-01-11 NOTE — ED NOTES
Pts son called to let us know the pt's home oxygen equipment has arrived. His number is [phone]

## 2025-01-11 NOTE — ED NOTES
Writer talked to case management from [facility] who stated there was a miscommunication and the oxygen delivery people are supposed to have everything delivered to patients house within the next couple hours. States son will call when it'd ready so patient can have transport set up to get patient home.

## 2025-01-11 NOTE — ED NOTES
Patient resting in bed, denies any further needs. Call light within reach, bed alarm in place and plugged in since arrival

## 2025-01-11 NOTE — ED PROVIDER NOTES
[facility] Emergency Department    CHIEF COMPLAINT  OTHER (Was sent home from [facility], but did not make sure patient had oxygen at home. [employer] brought here)      SHARED SERVICE VISIT  Evaluated by FLAKO.  My supervising physician was available for consultation.    HISTORY OF PRESENT ILLNESS  Anna Phelan is a 74 y.o. female who presents to the ED due to not having supplemental oxygen at home.  She was discharged from rehab facility today home, and when EMS company got patient home there was no supplemental oxygen.  EMS company transported patient to this emergency department for further evaluation.  On arrival patient has no complaints at this time. Denies any headache, body ache, fevers or chills.  Denies any coughing or sneezing.  Denies any sore throat or congestion.  Denies any vision changes or dizziness.  Denies any chest pain, shortness of breath, or dyspnea on exertion.  Denies any nausea, vomiting, or abdominal pain.  Denies any urinary symptoms.  Denies any diarrhea or bloody stools.  Denies any new onset back pain.  Denies any recent travel or sick contacts.    No other complaints, modifying factors or associated symptoms.     Nursing notes reviewed.   Past Medical History:   Diagnosis Date    Anxiety     Asthma     COPD (chronic obstructive pulmonary disease) (HCC)     Emphysema of lung (HCC)     Hypertension     Palpitations 9/23/2022     Past Surgical History:   Procedure Laterality Date    CARPAL TUNNEL RELEASE Bilateral     EYE SURGERY      KNEE SURGERY Left     SINUS SURGERY      SKIN CANCER EXCISION      basal cell removal under left eye     Family History   Problem Relation Age of Onset    High Blood Pressure Mother     Asthma Mother     Cancer Father         renal cell carcinoma    Other Sister         fibromyalgia    Other Maternal Grandfather         black lung    Colon Cancer Neg Hx     Breast Cancer Neg Hx      Social History     Socioeconomic  Number Of Freeze-Thaw Cycles: 3 freeze-thaw cycles Render Post-Care Instructions In Note?: no really needed?: No     No current facility-administered medications for this encounter.     Current Outpatient Medications   Medication Sig Dispense Refill    albuterol sulfate HFA (PROVENTIL;VENTOLIN;PROAIR) 108 (90 Base) MCG/ACT inhaler INHALE 2 PUFFS INTO THE LUNGS EVERY 6 HOURS AS NEEDED FOR WHEEZING 6.7 g 2    lisinopril (PRINIVIL;ZESTRIL) 2.5 MG tablet TAKE 1 TABLET BY MOUTH DAILY 90 tablet 0    TRELEGY ELLIPTA 100-62.5-25 MCG/ACT AEPB inhaler INHALE 1 PUFF INTO THE LUNGS DAILY 60 each 5    fluticasone (FLONASE) 50 MCG/ACT nasal spray 2 sprays by Nasal route daily 16 g 2    melatonin 3 MG TABS tablet Take 1 tablet by mouth nightly as needed       Allergies   Allergen Reactions    Amoxicillin Other (See Comments)    Ampicillin Other (See Comments)    Doxycycline     Lidocaine Other (See Comments)       REVIEW OF SYSTEMS  10 systems reviewed, pertinent positives per HPI otherwise noted to be negative    PHYSICAL EXAM  /74   Pulse 97   Temp 98.5 °F (36.9 °C)   Resp 18   SpO2 97%   GENERAL APPEARANCE: Awake and alert. Cooperative.  No acute distress.  Chronically ill in appearance.  HEAD: Normocephalic. Atraumatic.  No mckeon signs or raccoon eyes.  EYES: PERRL. EOM's grossly intact.  No conjunctival injection or discharge.  No icterus.  ENT: Mucous membranes are pink and moist.   NECK: Supple.  Full range of motion with no neck pain or stiffness.  Tracheostomy tube in place.  HEART: RRR. No murmurs.  LUNGS: Respirations unlabored. CTAB. Good air exchange. Speaking comfortably in full sentences.   ABDOMEN: Soft. Non-distended. Non-tender. No guarding or rebound.   No masses. No organomegaly.   EXTREMITIES: No peripheral edema. Moves all extremities equally. All extremities neurovascularly intact.   SKIN: Warm and dry. No acute rashes.   NEUROLOGICAL: Alert and oriented. CN's 2-12 intact. No gross facial drooping. Strength 5/5, sensation intact.   PSYCHIATRIC: Normal mood and affect.    RADIOLOGY  No  Consent: The patient's consent was obtained including but not limited to risks of crusting, scabbing, blistering, scarring, darker or lighter pigmentary change, recurrence, incomplete removal and infection. Detail Level: Simple Show Aperture Variable?: Yes Duration Of Freeze Thaw-Cycle (Seconds): 3 Post-Care Instructions: I reviewed with the patient in detail post-care instructions. Patient is to wear sunprotection, and avoid picking at any of the treated lesions. Pt may apply Vaseline to crusted or scabbing areas.

## 2025-01-13 ENCOUNTER — TELEPHONE (OUTPATIENT)
Dept: PRIMARY CARE CLINIC | Age: 75
End: 2025-01-13

## 2025-01-13 NOTE — TELEPHONE ENCOUNTER
Pt's son, Doug, called with questions about pt's upcoming appt on 1/20/25.  Doug sts pt is still recovering from a stroke and has limited mobility and not able to speak very well.  Doug is concerned that he won't be able to transport pt to the appt and was wondering if it would be possible to do a vv.  Advised pt's son that Dr Moore is out of the office until 1/14/25 and someone will call him tomorrow.  Pt's son amendable to plan

## 2025-01-14 NOTE — TELEPHONE ENCOUNTER
Called and spoke with pt's son.  Advised son that message was discussed with Dr Moore and we are able to do pt's Hospital follow up as a vv due to pt's situation..    Pt's son, Doug, is amendable to plan and requests that vv link be sent to him so he can assist his mother with the visit.

## 2025-01-20 ENCOUNTER — TELEMEDICINE (OUTPATIENT)
Dept: PRIMARY CARE CLINIC | Age: 75
End: 2025-01-20

## 2025-01-20 DIAGNOSIS — J44.9 PULMONARY CACHEXIA DUE TO CHRONIC OBSTRUCTIVE PULMONARY DISEASE (HCC): ICD-10-CM

## 2025-01-20 DIAGNOSIS — J41.0 SIMPLE CHRONIC BRONCHITIS (HCC): ICD-10-CM

## 2025-01-20 DIAGNOSIS — I50.9 HEART FAILURE, UNSPECIFIED HF CHRONICITY, UNSPECIFIED HEART FAILURE TYPE (HCC): ICD-10-CM

## 2025-01-20 DIAGNOSIS — R64 PULMONARY CACHEXIA DUE TO CHRONIC OBSTRUCTIVE PULMONARY DISEASE (HCC): ICD-10-CM

## 2025-01-20 DIAGNOSIS — J43.2 CENTRILOBULAR EMPHYSEMA (HCC): ICD-10-CM

## 2025-01-20 DIAGNOSIS — G89.29 OTHER CHRONIC PAIN: ICD-10-CM

## 2025-01-20 DIAGNOSIS — J44.1 CHRONIC OBSTRUCTIVE PULMONARY DISEASE WITH ACUTE EXACERBATION (HCC): ICD-10-CM

## 2025-01-20 DIAGNOSIS — I48.91 ATRIAL FIBRILLATION WITH RAPID VENTRICULAR RESPONSE (HCC): Primary | ICD-10-CM

## 2025-01-20 DIAGNOSIS — I63.312 CEREBROVASCULAR ACCIDENT (CVA) DUE TO THROMBOSIS OF LEFT MIDDLE CEREBRAL ARTERY (HCC): ICD-10-CM

## 2025-01-20 PROBLEM — I21.4 NSTEMI (NON-ST ELEVATED MYOCARDIAL INFARCTION) (HCC): Status: RESOLVED | Noted: 2024-10-23 | Resolved: 2025-01-20

## 2025-01-20 PROBLEM — F80.2 MIXED RECEPTIVE-EXPRESSIVE LANGUAGE DISORDER: Status: ACTIVE | Noted: 2018-10-05

## 2025-01-20 PROBLEM — I21.4 NSTEMI (NON-ST ELEVATED MYOCARDIAL INFARCTION) (HCC): Status: ACTIVE | Noted: 2024-10-23

## 2025-01-20 RX ORDER — POLYETHYLENE GLYCOL 3350 17 G/17G
17 POWDER, FOR SOLUTION ORAL DAILY
COMMUNITY

## 2025-01-20 RX ORDER — FAMOTIDINE 20 MG/1
20 TABLET, FILM COATED ORAL DAILY
COMMUNITY

## 2025-01-20 RX ORDER — CHOLECALCIFEROL (VITAMIN D3) 25 MCG
1000 TABLET ORAL DAILY
COMMUNITY
Start: 2025-01-09

## 2025-01-20 RX ORDER — ASPIRIN 81 MG/1
81 TABLET, CHEWABLE ORAL DAILY
COMMUNITY
Start: 2025-01-10

## 2025-01-20 RX ORDER — METOPROLOL TARTRATE 25 MG/1
12.5 TABLET, FILM COATED ORAL 2 TIMES DAILY
COMMUNITY

## 2025-01-20 RX ORDER — ACETAMINOPHEN 325 MG/1
650 TABLET ORAL EVERY 6 HOURS PRN
COMMUNITY
Start: 2024-12-04 | End: 2025-01-20

## 2025-01-20 RX ORDER — DOCUSATE SODIUM 50 MG AND SENNOSIDES 8.6 MG 8.6; 5 MG/1; MG/1
1 TABLET, FILM COATED ORAL NIGHTLY
COMMUNITY
Start: 2025-01-10

## 2025-01-20 RX ORDER — ATORVASTATIN CALCIUM 40 MG/1
40 TABLET, FILM COATED ORAL DAILY
COMMUNITY

## 2025-01-20 RX ORDER — BUDESONIDE 0.5 MG/2ML
INHALANT ORAL
COMMUNITY

## 2025-01-20 RX ORDER — MIRTAZAPINE 15 MG/1
15 TABLET, FILM COATED ORAL NIGHTLY
COMMUNITY
Start: 2025-01-09

## 2025-01-20 RX ORDER — OXYCODONE HYDROCHLORIDE 5 MG/1
5 TABLET ORAL DAILY PRN
COMMUNITY
Start: 2025-01-09

## 2025-01-20 RX ORDER — ARFORMOTEROL TARTRATE 15 UG/2ML
15 SOLUTION RESPIRATORY (INHALATION) 2 TIMES DAILY
COMMUNITY
Start: 2024-12-04 | End: 2025-01-20

## 2025-01-20 RX ORDER — QUETIAPINE FUMARATE 25 MG/1
25 TABLET, FILM COATED ORAL NIGHTLY
COMMUNITY

## 2025-01-20 RX ORDER — HYDROXYZINE HYDROCHLORIDE 25 MG/1
50 TABLET, FILM COATED ORAL 3 TIMES DAILY PRN
COMMUNITY
Start: 2025-01-17

## 2025-01-20 RX ORDER — IPRATROPIUM BROMIDE AND ALBUTEROL SULFATE 2.5; .5 MG/3ML; MG/3ML
1 SOLUTION RESPIRATORY (INHALATION) EVERY 6 HOURS
COMMUNITY

## 2025-01-20 RX ORDER — CLOPIDOGREL BISULFATE 75 MG/1
75 TABLET ORAL DAILY
COMMUNITY

## 2025-01-20 SDOH — ECONOMIC STABILITY: FOOD INSECURITY: WITHIN THE PAST 12 MONTHS, YOU WORRIED THAT YOUR FOOD WOULD RUN OUT BEFORE YOU GOT MONEY TO BUY MORE.: NEVER TRUE

## 2025-01-20 SDOH — ECONOMIC STABILITY: FOOD INSECURITY: WITHIN THE PAST 12 MONTHS, THE FOOD YOU BOUGHT JUST DIDN'T LAST AND YOU DIDN'T HAVE MONEY TO GET MORE.: PATIENT DECLINED

## 2025-01-20 SDOH — ECONOMIC STABILITY: FOOD INSECURITY: WITHIN THE PAST 12 MONTHS, YOU WORRIED THAT YOUR FOOD WOULD RUN OUT BEFORE YOU GOT MONEY TO BUY MORE.: PATIENT DECLINED

## 2025-01-20 SDOH — ECONOMIC STABILITY: TRANSPORTATION INSECURITY
IN THE PAST 12 MONTHS, HAS THE LACK OF TRANSPORTATION KEPT YOU FROM MEDICAL APPOINTMENTS OR FROM GETTING MEDICATIONS?: PATIENT DECLINED

## 2025-01-20 SDOH — ECONOMIC STABILITY: TRANSPORTATION INSECURITY
IN THE PAST 12 MONTHS, HAS LACK OF TRANSPORTATION KEPT YOU FROM MEETINGS, WORK, OR FROM GETTING THINGS NEEDED FOR DAILY LIVING?: PATIENT DECLINED

## 2025-01-20 SDOH — ECONOMIC STABILITY: FOOD INSECURITY: WITHIN THE PAST 12 MONTHS, THE FOOD YOU BOUGHT JUST DIDN'T LAST AND YOU DIDN'T HAVE MONEY TO GET MORE.: NEVER TRUE

## 2025-01-20 SDOH — ECONOMIC STABILITY: INCOME INSECURITY: IN THE LAST 12 MONTHS, WAS THERE A TIME WHEN YOU WERE NOT ABLE TO PAY THE MORTGAGE OR RENT ON TIME?: PATIENT DECLINED

## 2025-01-20 ASSESSMENT — ENCOUNTER SYMPTOMS
VOICE CHANGE: 1
TROUBLE SWALLOWING: 1

## 2025-01-20 ASSESSMENT — PATIENT HEALTH QUESTIONNAIRE - PHQ9
2. FEELING DOWN, DEPRESSED OR HOPELESS: SEVERAL DAYS
1. LITTLE INTEREST OR PLEASURE IN DOING THINGS: SEVERAL DAYS
SUM OF ALL RESPONSES TO PHQ QUESTIONS 1-9: 2
SUM OF ALL RESPONSES TO PHQ9 QUESTIONS 1 & 2: 2

## 2025-01-20 NOTE — ASSESSMENT & PLAN NOTE
Poorly controlled. Getting some home services. Extensive conversation with son and patient that given their difficulties getting in with me and proximity, possibly they should consider home care physician ([facility]) or [facility]. One of the struggles we have had has been loss to f/u despite best efforts, with the goal being things do not go without needing to be done to prevent another event like this one. They will discuss as a family and let me know.

## 2025-01-20 NOTE — ASSESSMENT & PLAN NOTE
Poorly controlled. Also now s/p CVA. Extensive goals of care conversation with son and patient today, they will decide next steps.

## 2025-01-20 NOTE — PROGRESS NOTES
Anna Phelan is a 74 y.o. year old female here for:    Anna Phelan, was evaluated through a synchronous (real-time) audio-video encounter. The patient (or guardian if applicable) is aware that this is a billable service, which includes applicable co-pays. This Virtual Visit was conducted with patient's (and/or legal guardian's) consent. Patient identification was verified, and a caregiver was present when appropriate.   The patient was located in the [facility]  Provider was located at Facility (Appt Dept): [address] [place] [address]  Confirm you are appropriately licensed, registered, or certified to deliver care in the [facility] where the patient is located as indicated above. If you are not or unsure, please re-schedule the visit: Yes, I confirm.     Chief Complaint:    Chief Complaint   Patient presents with    Symptoms after CVA     Subjective:    Today, her current concerns include:    HPI:  #S/p CVA  - Onset: Months ago  - Context: Home PT as well as Speech and OT are all set up. She is s/p trach and G Tube. Essentially homebound. Trouble speaking, swallowing, also with right sided weakness in body.  - Progression: Stable  - Associated Symptoms: Per ROS as otherwise stated in this note    Past Medical History:   Diagnosis Date    Anxiety     Asthma     COPD (chronic obstructive pulmonary disease) ([facility])     Emphysema of lung ([facility])     Hypertension     NSTEMI (non-ST elevated myocardial infarction) ([facility]) 10/23/2024    Palpitations 09/23/2022     Social History     Tobacco Use    Smoking status: Every Day     Current packs/day: 0.65     Average packs/day: 0.7 packs/day for 51.7 years (33.6 ttl pk-yrs)     Types: Cigarettes     Start date: 5/27/1973    Smokeless tobacco: Never    Tobacco comments:     09/08/22 3-4 cigs a day, per smoking hx audit, pt smoked 40 years in 2020, at heaviest pt smoked 1 ppd, current 0.25 ppd, average 0.65   Vaping Use    Vaping status: Never Used   Substance

## 2025-01-20 NOTE — ASSESSMENT & PLAN NOTE
Poorly controlled and on new opioids since admission and discharge. Pain medicine referral provided today should they choose to continue this medication. She has been using this sparingly and may switch to APAP.

## 2025-01-20 NOTE — ASSESSMENT & PLAN NOTE
Well controlled by sx. On Plavix. Extensive discussion that will need cardiology referral. Son will discuss and get back to me on this. Call back/ED precautions discussed.

## 2025-01-23 ENCOUNTER — TELEPHONE (OUTPATIENT)
Dept: PRIMARY CARE CLINIC | Age: 75
End: 2025-01-23

## 2025-01-23 NOTE — TELEPHONE ENCOUNTER
Emelia from [facility] called to see if they could get a verbal order to also add on for pt to see a registered dietitian.    Message relayed and discussed with Dr Moore and as per discussion with Dr Moore, verbal orders given for the registered dietitian.

## 2025-01-24 ENCOUNTER — COMMUNITY OUTREACH (OUTPATIENT)
Dept: PRIMARY CARE CLINIC | Age: 75
End: 2025-01-24

## 2025-01-24 NOTE — PROGRESS NOTES
Patient's  shows they are overdue for Hemoglobin A1C  Care Everywhere and  files searched.   updated with 10/28/24 and 10/25/24 A1C results.

## 2025-01-30 ENCOUNTER — TELEPHONE (OUTPATIENT)
Dept: PRIMARY CARE CLINIC | Age: 75
End: 2025-01-30

## 2025-01-30 NOTE — TELEPHONE ENCOUNTER
[facility] called to request for Dr Moore to place an order for Palliative Care for pt.  Requests that the order be faxed to [phone]

## 2025-01-31 NOTE — TELEPHONE ENCOUNTER
Rx completed and faxed to [facility].  Fax # provided was not correct.  Faxed to [phone]

## 2025-01-31 NOTE — TELEPHONE ENCOUNTER
Pt's son returned call and son sts that [place] did discuss this with him at pt's last visit.  Son sts he does want to proceed with palliative care for he feels it is best for pt since it is very difficult to take her to appointments.  Son does request the order to be placed for pt and faxed to [facility]

## 2025-02-07 DIAGNOSIS — I10 ESSENTIAL HYPERTENSION: ICD-10-CM

## 2025-02-07 RX ORDER — IPRATROPIUM BROMIDE AND ALBUTEROL SULFATE 2.5; .5 MG/3ML; MG/3ML
1 SOLUTION RESPIRATORY (INHALATION) EVERY 6 HOURS
Qty: 360 ML | Refills: 0 | Status: SHIPPED | OUTPATIENT
Start: 2025-02-07

## 2025-02-07 RX ORDER — ATORVASTATIN CALCIUM 40 MG/1
40 TABLET, FILM COATED ORAL DAILY
Qty: 30 TABLET | Refills: 0 | Status: SHIPPED | OUTPATIENT
Start: 2025-02-07

## 2025-02-07 RX ORDER — BUDESONIDE 0.5 MG/2ML
1 INHALANT ORAL 2 TIMES DAILY
Qty: 120 ML | Refills: 0 | Status: SHIPPED | OUTPATIENT
Start: 2025-02-07 | End: 2025-03-09

## 2025-02-07 RX ORDER — LISINOPRIL 5 MG/1
5 TABLET ORAL DAILY
Qty: 30 TABLET | Refills: 0 | Status: SHIPPED | OUTPATIENT
Start: 2025-02-07

## 2025-02-07 RX ORDER — CHOLECALCIFEROL (VITAMIN D3) 25 MCG
1000 TABLET ORAL DAILY
Qty: 30 TABLET | Refills: 0 | Status: SHIPPED | OUTPATIENT
Start: 2025-02-07

## 2025-02-07 RX ORDER — QUETIAPINE FUMARATE 25 MG/1
25 TABLET, FILM COATED ORAL NIGHTLY
Qty: 60 TABLET | Refills: 0 | Status: SHIPPED | OUTPATIENT
Start: 2025-02-07

## 2025-02-07 RX ORDER — CLOPIDOGREL BISULFATE 75 MG/1
75 TABLET ORAL DAILY
Qty: 30 TABLET | Refills: 0 | Status: SHIPPED | OUTPATIENT
Start: 2025-02-07

## 2025-02-07 RX ORDER — DOCUSATE SODIUM 50 MG AND SENNOSIDES 8.6 MG 8.6; 5 MG/1; MG/1
1 TABLET, FILM COATED ORAL NIGHTLY
Qty: 30 TABLET | Refills: 0 | Status: SHIPPED | OUTPATIENT
Start: 2025-02-07

## 2025-02-07 RX ORDER — FAMOTIDINE 20 MG/1
20 TABLET, FILM COATED ORAL DAILY
Qty: 60 TABLET | Refills: 0 | Status: SHIPPED | OUTPATIENT
Start: 2025-02-07

## 2025-02-07 RX ORDER — ASPIRIN 81 MG/1
81 TABLET, CHEWABLE ORAL DAILY
Qty: 30 TABLET | Refills: 0 | Status: SHIPPED | OUTPATIENT
Start: 2025-02-07

## 2025-02-07 RX ORDER — HYDROXYZINE HYDROCHLORIDE 25 MG/1
50 TABLET, FILM COATED ORAL 3 TIMES DAILY PRN
Qty: 90 TABLET | Refills: 0 | OUTPATIENT
Start: 2025-02-07 | End: 2025-05-08

## 2025-02-07 RX ORDER — MIRTAZAPINE 15 MG/1
15 TABLET, FILM COATED ORAL NIGHTLY
Qty: 30 TABLET | Refills: 0 | Status: SHIPPED | OUTPATIENT
Start: 2025-02-07

## 2025-02-07 RX ORDER — METOPROLOL TARTRATE 25 MG/1
12.5 TABLET, FILM COATED ORAL 2 TIMES DAILY
Qty: 60 TABLET | Refills: 0 | Status: SHIPPED | OUTPATIENT
Start: 2025-02-07

## 2025-02-10 RX ORDER — METOPROLOL TARTRATE 25 MG/1
TABLET, FILM COATED ORAL
Qty: 90 TABLET | Refills: 0 | Status: SHIPPED | OUTPATIENT
Start: 2025-02-10

## 2025-02-10 RX ORDER — CLOPIDOGREL BISULFATE 75 MG/1
TABLET ORAL
Qty: 90 TABLET | Refills: 0 | Status: SHIPPED | OUTPATIENT
Start: 2025-02-10

## 2025-02-10 RX ORDER — ATORVASTATIN CALCIUM 40 MG/1
TABLET, FILM COATED ORAL
Qty: 90 TABLET | Refills: 0 | Status: SHIPPED | OUTPATIENT
Start: 2025-02-10

## 2025-02-10 RX ORDER — FAMOTIDINE 20 MG/1
TABLET, FILM COATED ORAL
Qty: 90 TABLET | Refills: 0 | Status: SHIPPED | OUTPATIENT
Start: 2025-02-10

## 2025-02-10 RX ORDER — BUDESONIDE 0.5 MG/2ML
INHALANT ORAL
Qty: 360 ML | Refills: 0 | Status: SHIPPED | OUTPATIENT
Start: 2025-02-10

## 2025-02-11 NOTE — TELEPHONE ENCOUNTER
Submitted PA for Budesonide 0.5MG/2ML suspension   Via CMM Key: [id number]  STATUS: not sent     Request received thru CCM     Patient has medicare coverage. Nebulizer and nebulizer medications will need to be billed under the medical portion (part B) at the pharmacy or through DME company.     If this requires a response please respond to the pool ( P MHCX PSC MEDICATION PRE-AUTH).      Thank you please advise patient.

## 2025-02-11 NOTE — TELEPHONE ENCOUNTER
Refill Request       Last Seen: Last Seen Department: 1/20/2025  Last Seen by PCP: 1/20/2025        Next Appointment:   No future appointments.    Message to  to schedule appointment.         Requested Prescriptions     Pending Prescriptions Disp Refills    ipratropium 0.5 mg-albuterol 2.5 mg (DUONEB) 0.5-2.5 (3) MG/3ML SOLN nebulizer solution [Pharmacy Med Name: IPRATROPI/ALB 0.5/3MG INH SOLUTION]       Sig: USE 3 ML VIA NEBULIZER EVERY 6 HOURS

## 2025-02-12 RX ORDER — IPRATROPIUM BROMIDE AND ALBUTEROL SULFATE 2.5; .5 MG/3ML; MG/3ML
SOLUTION RESPIRATORY (INHALATION)
Qty: 90 EACH | Refills: 0 | Status: SHIPPED | OUTPATIENT
Start: 2025-02-12